# Patient Record
Sex: FEMALE | Race: WHITE | NOT HISPANIC OR LATINO | Employment: FULL TIME | URBAN - METROPOLITAN AREA
[De-identification: names, ages, dates, MRNs, and addresses within clinical notes are randomized per-mention and may not be internally consistent; named-entity substitution may affect disease eponyms.]

---

## 2018-03-29 ENCOUNTER — APPOINTMENT (OUTPATIENT)
Dept: RADIOLOGY | Facility: MEDICAL CENTER | Age: 28
End: 2018-03-29
Attending: EMERGENCY MEDICINE
Payer: COMMERCIAL

## 2018-03-29 ENCOUNTER — HOSPITAL ENCOUNTER (EMERGENCY)
Facility: MEDICAL CENTER | Age: 28
End: 2018-03-29
Attending: EMERGENCY MEDICINE
Payer: COMMERCIAL

## 2018-03-29 VITALS
DIASTOLIC BLOOD PRESSURE: 63 MMHG | TEMPERATURE: 98.3 F | SYSTOLIC BLOOD PRESSURE: 154 MMHG | HEART RATE: 89 BPM | BODY MASS INDEX: 31.43 KG/M2 | RESPIRATION RATE: 16 BRPM | OXYGEN SATURATION: 96 % | WEIGHT: 195.55 LBS | HEIGHT: 66 IN

## 2018-03-29 DIAGNOSIS — O20.0 THREATENED MISCARRIAGE: Primary | ICD-10-CM

## 2018-03-29 LAB
ANION GAP SERPL CALC-SCNC: 8 MMOL/L (ref 0–11.9)
B-HCG SERPL-ACNC: 231.5 MIU/ML (ref 0–5)
BASOPHILS # BLD AUTO: 0.7 % (ref 0–1.8)
BASOPHILS # BLD: 0.05 K/UL (ref 0–0.12)
BUN SERPL-MCNC: 16 MG/DL (ref 8–22)
CALCIUM SERPL-MCNC: 9.3 MG/DL (ref 8.5–10.5)
CHLORIDE SERPL-SCNC: 104 MMOL/L (ref 96–112)
CO2 SERPL-SCNC: 25 MMOL/L (ref 20–33)
CREAT SERPL-MCNC: 0.62 MG/DL (ref 0.5–1.4)
EOSINOPHIL # BLD AUTO: 0.07 K/UL (ref 0–0.51)
EOSINOPHIL NFR BLD: 1 % (ref 0–6.9)
ERYTHROCYTE [DISTWIDTH] IN BLOOD BY AUTOMATED COUNT: 41.6 FL (ref 35.9–50)
GLUCOSE SERPL-MCNC: 113 MG/DL (ref 65–99)
HCG SERPL QL: POSITIVE
HCT VFR BLD AUTO: 41.8 % (ref 37–47)
HGB BLD-MCNC: 14 G/DL (ref 12–16)
IMM GRANULOCYTES # BLD AUTO: 0.02 K/UL (ref 0–0.11)
IMM GRANULOCYTES NFR BLD AUTO: 0.3 % (ref 0–0.9)
LYMPHOCYTES # BLD AUTO: 1.95 K/UL (ref 1–4.8)
LYMPHOCYTES NFR BLD: 27.5 % (ref 22–41)
MCH RBC QN AUTO: 27.4 PG (ref 27–33)
MCHC RBC AUTO-ENTMCNC: 33.5 G/DL (ref 33.6–35)
MCV RBC AUTO: 81.8 FL (ref 81.4–97.8)
MONOCYTES # BLD AUTO: 0.46 K/UL (ref 0–0.85)
MONOCYTES NFR BLD AUTO: 6.5 % (ref 0–13.4)
NEUTROPHILS # BLD AUTO: 4.55 K/UL (ref 2–7.15)
NEUTROPHILS NFR BLD: 64 % (ref 44–72)
NRBC # BLD AUTO: 0 K/UL
NRBC BLD-RTO: 0 /100 WBC
NUMBER OF RH DOSES IND 8505RD: NORMAL
PLATELET # BLD AUTO: 288 K/UL (ref 164–446)
PMV BLD AUTO: 11 FL (ref 9–12.9)
POTASSIUM SERPL-SCNC: 3.3 MMOL/L (ref 3.6–5.5)
RBC # BLD AUTO: 5.11 M/UL (ref 4.2–5.4)
RH BLD: NORMAL
SODIUM SERPL-SCNC: 137 MMOL/L (ref 135–145)
WBC # BLD AUTO: 7.1 K/UL (ref 4.8–10.8)

## 2018-03-29 PROCEDURE — 80048 BASIC METABOLIC PNL TOTAL CA: CPT

## 2018-03-29 PROCEDURE — 84702 CHORIONIC GONADOTROPIN TEST: CPT

## 2018-03-29 PROCEDURE — 86901 BLOOD TYPING SEROLOGIC RH(D): CPT

## 2018-03-29 PROCEDURE — 84703 CHORIONIC GONADOTROPIN ASSAY: CPT

## 2018-03-29 PROCEDURE — 85025 COMPLETE CBC W/AUTO DIFF WBC: CPT

## 2018-03-29 PROCEDURE — 99284 EMERGENCY DEPT VISIT MOD MDM: CPT

## 2018-03-29 PROCEDURE — 76817 TRANSVAGINAL US OBSTETRIC: CPT

## 2018-03-29 NOTE — DISCHARGE INSTRUCTIONS
Threatened Miscarriage  A threatened miscarriage occurs when you have vaginal bleeding during your first 20 weeks of pregnancy but the pregnancy has not ended. If you have vaginal bleeding during this time, your health care provider will do tests to make sure you are still pregnant. If the tests show you are still pregnant and the developing baby (fetus) inside your womb (uterus) is still growing, your condition is considered a threatened miscarriage.  A threatened miscarriage does not mean your pregnancy will end, but it does increase the risk of losing your pregnancy (complete miscarriage).  What are the causes?  The cause of a threatened miscarriage is usually not known. If you go on to have a complete miscarriage, the most common cause is an abnormal number of chromosomes in the developing baby. Chromosomes are the structures inside cells that hold all your genetic material.  Some causes of vaginal bleeding that do not result in miscarriage include:  · Having sex.  · Having an infection.  · Normal hormone changes of pregnancy.  · Bleeding that occurs when an egg implants in your uterus.  What increases the risk?  Risk factors for bleeding in early pregnancy include:  · Obesity.  · Smoking.  · Drinking excessive amounts of alcohol or caffeine.  · Recreational drug use.  What are the signs or symptoms?  · Light vaginal bleeding.  · Mild abdominal pain or cramps.  How is this diagnosed?  If you have bleeding with or without abdominal pain before 20 weeks of pregnancy, your health care provider will do tests to check whether you are still pregnant. One important test involves using sound waves and a computer (ultrasound) to create images of the inside of your uterus. Other tests include an internal exam of your vagina and uterus (pelvic exam) and measurement of your baby’s heart rate.  You may be diagnosed with a threatened miscarriage if:  · Ultrasound testing shows you are still pregnant.  · Your baby’s heart  rate is strong.  · A pelvic exam shows that the opening between your uterus and your vagina (cervix) is closed.  · Your heart rate and blood pressure are stable.  · Blood tests confirm you are still pregnant.  How is this treated?  No treatments have been shown to prevent a threatened miscarriage from going on to a complete miscarriage. However, the right home care is important.  Follow these instructions at home:  · Make sure you keep all your appointments for prenatal care. This is very important.  · Get plenty of rest.  · Do not have sex or use tampons if you have vaginal bleeding.  · Do not douche.  · Do not smoke or use recreational drugs.  · Do not drink alcohol.  · Avoid caffeine.  Contact a health care provider if:  · You have light vaginal bleeding or spotting while pregnant.  · You have abdominal pain or cramping.  · You have a fever.  Get help right away if:  · You have heavy vaginal bleeding.  · You have blood clots coming from your vagina.  · You have severe low back pain or abdominal cramps.  · You have fever, chills, and severe abdominal pain.  This information is not intended to replace advice given to you by your health care provider. Make sure you discuss any questions you have with your health care provider.  Document Released: 12/18/2006 Document Revised: 05/25/2017 Document Reviewed: 10/14/2014  [employer] Interactive Patient Education © 2017 [employer]    Ectopic Pregnancy  An ectopic pregnancy happens when a fertilized egg grows outside the uterus. A pregnancy cannot live outside of the uterus. This problem often happens in the fallopian tube. It is often caused by damage to the fallopian tube.  If this problem is found early, you may be treated with medicine. If your tube tears or bursts open (ruptures), you will bleed inside. This is an emergency. You will need surgery. Get help right away.  What are the signs or symptoms?  You may have normal pregnancy symptoms at first. These  include:  · Missing your period.  · Feeling sick to your stomach (nauseous).  · Being tired.  · Having tender breasts.  Then, you may start to have symptoms that are not normal. These include:  · Pain with sex (intercourse).  · Bleeding from the vagina. This includes light bleeding (spotting).  · Belly (abdomen) or lower belly cramping or pain. This may be felt on one side.  · A fast heartbeat (pulse).  · Passing out (fainting) after going poop (bowel movement).  If your tube tears, you may have symptoms such as:  · Really bad pain in the belly or lower belly. This happens suddenly.  · Dizziness.  · Passing out.  · Shoulder pain.  Get help right away if:  You have any of these symptoms. This is an emergency.  This information is not intended to replace advice given to you by your health care provider. Make sure you discuss any questions you have with your health care provider.  Document Released: 03/16/2010 Document Revised: 05/25/2017 Document Reviewed: 07/30/2014  [employer] Interactive Patient Education © 2017 [employer]  Threatened Miscarriage  A threatened miscarriage occurs when you have vaginal bleeding during your first 20 weeks of pregnancy but the pregnancy has not ended. If you have vaginal bleeding during this time, your health care provider will do tests to make sure you are still pregnant. If the tests show you are still pregnant and the developing baby (fetus) inside your womb (uterus) is still growing, your condition is considered a threatened miscarriage.  A threatened miscarriage does not mean your pregnancy will end, but it does increase the risk of losing your pregnancy (complete miscarriage).  What are the causes?  The cause of a threatened miscarriage is usually not known. If you go on to have a complete miscarriage, the most common cause is an abnormal number of chromosomes in the developing baby. Chromosomes are the structures inside cells that hold all your genetic material.  Some causes of  vaginal bleeding that do not result in miscarriage include:  · Having sex.  · Having an infection.  · Normal hormone changes of pregnancy.  · Bleeding that occurs when an egg implants in your uterus.  What increases the risk?  Risk factors for bleeding in early pregnancy include:  · Obesity.  · Smoking.  · Drinking excessive amounts of alcohol or caffeine.  · Recreational drug use.  What are the signs or symptoms?  · Light vaginal bleeding.  · Mild abdominal pain or cramps.  How is this diagnosed?  If you have bleeding with or without abdominal pain before 20 weeks of pregnancy, your health care provider will do tests to check whether you are still pregnant. One important test involves using sound waves and a computer (ultrasound) to create images of the inside of your uterus. Other tests include an internal exam of your vagina and uterus (pelvic exam) and measurement of your baby’s heart rate.  You may be diagnosed with a threatened miscarriage if:  · Ultrasound testing shows you are still pregnant.  · Your baby’s heart rate is strong.  · A pelvic exam shows that the opening between your uterus and your vagina (cervix) is closed.  · Your heart rate and blood pressure are stable.  · Blood tests confirm you are still pregnant.  How is this treated?  No treatments have been shown to prevent a threatened miscarriage from going on to a complete miscarriage. However, the right home care is important.  Follow these instructions at home:  · Make sure you keep all your appointments for prenatal care. This is very important.  · Get plenty of rest.  · Do not have sex or use tampons if you have vaginal bleeding.  · Do not douche.  · Do not smoke or use recreational drugs.  · Do not drink alcohol.  · Avoid caffeine.  Contact a health care provider if:  · You have light vaginal bleeding or spotting while pregnant.  · You have abdominal pain or cramping.  · You have a fever.  Get help right away if:  · You have heavy vaginal  bleeding.  · You have blood clots coming from your vagina.  · You have severe low back pain or abdominal cramps.  · You have fever, chills, and severe abdominal pain.  This information is not intended to replace advice given to you by your health care provider. Make sure you discuss any questions you have with your health care provider.  Document Released: 12/18/2006 Document Revised: 05/25/2017 Document Reviewed: 10/14/2014  [employer] Interactive Patient Education © 2017 [employer]

## 2018-03-29 NOTE — ED NOTES
Patient dc'd to home w/ self. Patient alert and oriented x 4. Patient ambulatory w/ steady gait. Patient verbalizes understanding of discharge instructions, follow up lab work ([facility] lab slip provided by [facility]), follow up w/ OB/GYN. Patient denies questions upon discharge.

## 2018-03-29 NOTE — ED TRIAGE NOTES
Pt states is 6 weeks pregnant, reports spotting x 4-5 days, more red today. Denies abd cramping, states had back pain earlier that subsided.

## 2018-03-29 NOTE — ED PROVIDER NOTES
"ED Provider Note    Scribed for Sarkis Delaney M.D. by Deepti Vanegas. 3/29/2018  12:47 PM    Primary Care Provider: None  Means of arrival: Walk in  History limited by: None    CHIEF COMPLAINT  Chief Complaint   Patient presents with   • Vaginal Bleeding       HPI  Amy Rey is a 27 y.o. female who presents to the ED complaining of vaginal bleeding. She states her last period was 6 weeks ago she's had a positive pregnancy test but having spotting vaginally for 3-4 days. No pain. Nothing makes it better or worse.    One spontaneous       REVIEW OF SYSTEMS    CONSTITUTIONAL:  Denies fever, chills, weight gain/loss, or weakness.  CARDIOVASCULAR:  Denies chest pain, palpitations, or swelling.  RESPIRATORY:  Denies cough, shortness of breath,  GI:  Denies abdominal pain,  vomiting, or diarrhea.  : Positive vaginal bleeding spotting..   MUSCULOSKELETAL:  Denies weakness, joint swelling, or back pain.  SKIN:  No rash or bruising.  NEUROLOGIC:  Denies headache, focal weakness, or numbness.  PSYCHIATRIC:  Denies depression.    See HPI for further details. C.      PAST MEDICAL HISTORY  Patient is six weeks gravid.      FAMILY HISTORY  History reviewed. No pertinent family history.       SOCIAL HISTORY  Patient denies a pertinent social history.      SURGICAL HISTORY  Patient denies a pertinent surgical history.      CURRENT MEDICATIONS  None    ALLERGIES  None      PHYSICAL EXAM  VITAL SIGNS: /63   Pulse (!) 112   Temp 36.8 °C (98.3 °F) (Temporal)   Resp 16   Ht 1.676 m (5' 6\")   Wt 88.7 kg (195 lb 8.8 oz)   SpO2 98%   BMI 31.56 kg/m²        Constitutional: Patient is awake and alert. No acute respiratory distress. Well developed, Well nourished, Non-toxic appearance.  HENT: Normocephalic, Atraumatic  Eyes:  Sclera and conjunctiva clear,   Neck:  Supple no nuchal rigidity, . Non-tender  Cardiovascular: Heart is regular rate and rhythm no murmur, rub or thrill.   Thorax & Lungs: Chest is " symmetrical, with good breath sounds. No wheezing or crackles.  Abdomen: Soft, No tenderness no hepatosplenomegaly there is no guarding or rebound, No masses, No pulsatile masses.   Pelvic: Female nurse present. External genitalia normal. Vault with scant blood. Cervical os closed.  Skin: Warm, Dry, no petechia, purpura, or rash.   Back: Non tender with palpation, No CVA tenderness.   Extremities: No edema. Non tender.   Musculoskeletal: Good range of motion to upper or lower extremities   Neurologic: Alert & oriented  Strength is symmetrical in upper and lower extremities. DTRs are 2+ in the patellas    Psychiatric: Normal affect.          LABS  Results for orders placed or performed during the hospital encounter of 03/29/18   CBC WITH DIFFERENTIAL   Result Value Ref Range    WBC 7.1 4.8 - 10.8 K/uL    RBC 5.11 4.20 - 5.40 M/uL    Hemoglobin 14.0 12.0 - 16.0 g/dL    Hematocrit 41.8 37.0 - 47.0 %    MCV 81.8 81.4 - 97.8 fL    MCH 27.4 27.0 - 33.0 pg    MCHC 33.5 (L) 33.6 - 35.0 g/dL    RDW 41.6 35.9 - 50.0 fL    Platelet Count 288 164 - 446 K/uL    MPV 11.0 9.0 - 12.9 fL    Neutrophils-Polys 64.00 44.00 - 72.00 %    Lymphocytes 27.50 22.00 - 41.00 %    Monocytes 6.50 0.00 - 13.40 %    Eosinophils 1.00 0.00 - 6.90 %    Basophils 0.70 0.00 - 1.80 %    Immature Granulocytes 0.30 0.00 - 0.90 %    Nucleated RBC 0.00 /100 WBC    Neutrophils (Absolute) 4.55 2.00 - 7.15 K/uL    Lymphs (Absolute) 1.95 1.00 - 4.80 K/uL    Monos (Absolute) 0.46 0.00 - 0.85 K/uL    Eos (Absolute) 0.07 0.00 - 0.51 K/uL    Baso (Absolute) 0.05 0.00 - 0.12 K/uL    Immature Granulocytes (abs) 0.02 0.00 - 0.11 K/uL    NRBC (Absolute) 0.00 K/uL   BASIC METABOLIC PANEL   Result Value Ref Range    Sodium 137 135 - 145 mmol/L    Potassium 3.3 (L) 3.6 - 5.5 mmol/L    Chloride 104 96 - 112 mmol/L    Co2 25 20 - 33 mmol/L    Glucose 113 (H) 65 - 99 mg/dL    Bun 16 8 - 22 mg/dL    Creatinine 0.62 0.50 - 1.40 mg/dL    Calcium 9.3 8.5 - 10.5 mg/dL    Anion  Gap 8.0 0.0 - 11.9   HCG QUAL SERUM   Result Value Ref Range    Beta-Hcg Qualitative Serum Positive (A) Negative   RH TYPE FOR RHOGAM FROM E.D.   Result Value Ref Range    Emergency Department Rh Typing POS     Number Of Rh Doses Indicated ZERO    ESTIMATED GFR   Result Value Ref Range    GFR If African American >60 >60 mL/min/1.73 m 2    GFR If Non African American >60 >60 mL/min/1.73 m 2   HCG QUANTITATIVE SERUM   Result Value Ref Range    Bhcg 231.5 (H) 0.0 - 5.0 mIU/mL      All labs reviewed by me.      RADIOLOGY/PROCEDURES  US-OB PELVIS TRANSVAGINAL    (Results Pending)     US-OB PELVIS TRANSVAGINAL   Final Result      No intrauterine pregnancy is identified. Findings can be seen in the setting of very early pregnancy, missed spontaneous , with ectopic pregnancy not excluded. No adnexal mass is seen. Correlation with beta-hCG level and follow-up beta-hCG level    is recommended.      Normal appearance of the ovaries.      Thickened endometrial stripe.             The radiologist's interpretations of all radiological studies have been reviewed by me.       COURSE & MEDICAL DECISION MAKING  Pertinent Labs & Imaging studies reviewed. (See chart for details)    Differential Diagnosis include but are not limited to:  IUP, ectopic pregnancy, threatened miscarriage or UTI.    12:35 PM Obtained and reviewed patient's electronic medical record which indicates no prior ED visits.    12:38 PM Patient seen and examined at bedside. Patient presents for vaginal bleeding.      Initial orders in the Emergency Department included laboratory testing: CBC with differential, BMP, estimated GFR, HCG qualitative serum and Rh type.     1:23 PM Pelvic exam was completed with the assistance of a female nurse as noted above.         The patient will return for new or persisting symptoms including pain bleeding vomiting fevers..  The patient verbalizes understanding and will comply.  Patient is stable at the time of discharge.       Patient who is pregnant and having spotting. Her ultrasound does not show any intrauterine pregnancy or obvious ectopic pregnancy. We are not sure the pregnancy is. Her quantitative beta hCG is positive but low she is Rh+. This period of time we'll billow discharge her home for close follow-up as an outpatient. Again she has no discomfort or pain whatsoever. I did discuss the case with her that she could have an ectopic pregnancy or early intrauterine pregnancy or she could've had fetal demise. She will follow up with the ObGyn doctor on call Dr. Don wife talked to on the phone and she will see her in follow-up.    DISPOSITION  Patient will be discharged home in stable condition.      FOLLOW UP  Dr. Don within 1 week  Repeat quantitative beta hCG on Saturday    The patient is referred to a primary physician for blood pressure management, diabetic screening, and for all other preventative health concerns.      OUTPATIENT MEDICATIONS  New Prescriptions    No medications on file         DIAGNOSIS  1. Vaginal bleeding  2. Early intrauterine pregnancy versus ectopic pregnancy versus blighted ovum       PLAN    1. Repeat quantitative beta hCG in 2 days  2. Force juices and liquids  3. Ectopic pregnancy information sheet/threatened miscarriage information sheet/pelvic rest.  4. Return to the emergency department for increased pains, fevers, vomiting or change in condition.  5. Follow-up with Dr. Pollard call tomorrow for an appointment within one week      The note accurately reflects work and decisions made by me.  Sarkis Delaney  3/29/2018  5:12 PM     Deepti SUAZO (Scribe), am scribing for, and in the presence of, Sarkis Delaney M.D.    Electronically signed by: Deepti Thompson), 3/29/2018    Sarkis SUAZO M.D. personally performed the services described in this documentation, as scribed by Deepti Vanegas in my presence, and it is both accurate and complete.

## 2018-05-25 ENCOUNTER — OFFICE VISIT (OUTPATIENT)
Dept: MEDICAL GROUP | Facility: MEDICAL CENTER | Age: 28
End: 2018-05-25
Payer: COMMERCIAL

## 2018-05-25 VITALS
OXYGEN SATURATION: 97 % | BODY MASS INDEX: 31.02 KG/M2 | HEART RATE: 87 BPM | DIASTOLIC BLOOD PRESSURE: 76 MMHG | WEIGHT: 193 LBS | SYSTOLIC BLOOD PRESSURE: 118 MMHG | RESPIRATION RATE: 16 BRPM | HEIGHT: 66 IN | TEMPERATURE: 98.7 F

## 2018-05-25 DIAGNOSIS — Z83.3 FAMILY HISTORY OF DIABETES MELLITUS IN FATHER: ICD-10-CM

## 2018-05-25 DIAGNOSIS — F33.1 MODERATE EPISODE OF RECURRENT MAJOR DEPRESSIVE DISORDER (HCC): ICD-10-CM

## 2018-05-25 DIAGNOSIS — F41.9 ANXIETY: ICD-10-CM

## 2018-05-25 DIAGNOSIS — E66.9 OBESITY (BMI 30-39.9): ICD-10-CM

## 2018-05-25 PROCEDURE — 99204 OFFICE O/P NEW MOD 45 MIN: CPT | Performed by: NURSE PRACTITIONER

## 2018-05-25 RX ORDER — ALPRAZOLAM 0.25 MG/1
0.25 TABLET ORAL NIGHTLY PRN
COMMUNITY
End: 2018-05-25

## 2018-05-25 RX ORDER — ESCITALOPRAM OXALATE 10 MG/1
10 TABLET ORAL DAILY
Qty: 30 TAB | Refills: 11 | Status: SHIPPED | OUTPATIENT
Start: 2018-05-25 | End: 2018-12-12

## 2018-05-25 ASSESSMENT — PATIENT HEALTH QUESTIONNAIRE - PHQ9
CLINICAL INTERPRETATION OF PHQ2 SCORE: 2
5. POOR APPETITE OR OVEREATING: 2 - MORE THAN HALF THE DAYS
SUM OF ALL RESPONSES TO PHQ QUESTIONS 1-9: 12

## 2018-05-25 NOTE — ASSESSMENT & PLAN NOTE
Patient has struggled with anxiety for several years.  She was previously on multiple antidepressants and antianxiety medications.  Most recently she was taking Xanax for panic attacks.  She has not been on this for quite some time.  She has had significant worsening in her anxiety since stopping all of her medications when she moved from [place] to [place].  She has been attempting to cope with it with lifestyle, her anxiety has recently worsened after experiencing 2 miscarriages.  She is not currently having panic attacks, states that she has significant worry and tends to sit up at night and worry about things that she should not be worrying about.

## 2018-05-25 NOTE — ASSESSMENT & PLAN NOTE
Ongoing for several years. Was previously seeing a doctor in [place] and was on multiple different antidepressants and antianxiety medication, but with move and insurance change she no longer was able to obtain prescriptions. Most recent medications were Wellbutrin and xanax. Mostly feels that she is anxious which is contributing to the depression. Denies suicidal thoughts.     Depression Screening    Little interest or pleasure in doing things?  1 - several days  Feeling down, depressed , or hopeless? 1 - several days  Trouble falling or staying asleep, or sleeping too much?  2 - more than half the days  Feeling tired or having little energy?  2 - more than half the days  Poor appetite or overeating?  2 - more than half the days  Feeling bad about yourself - or that you are a failure or have let yourself or your family down? 2 - more than half the days  Trouble concentrating on things, such as reading the newspaper or watching television? 2 - more than half the days  Moving or speaking so slowly that other people could have noticed.  Or the opposite - being so fidgety or restless that you have been moving around a lot more than usual?  0 - not at all  Thoughts that you would be better off dead, or of hurting yourself?  0 - not at all  Patient Health Questionnaire Score: 12      If depressive symptoms identified deferred to follow up visit unless specifically addressed in assesment and plan.    Interpretation of PHQ-9 Total Score   Score Severity   10-14 Moderate Depression

## 2018-05-25 NOTE — PROGRESS NOTES
Amy Rey is a 27 y.o. female here to establish care and discuss her depression and anxiety:  HPI:      Moderate episode of recurrent major depressive disorder (HCC)  Ongoing for several years. Was previously seeing a doctor in [place] and was on multiple different antidepressants and antianxiety medication, but with move and insurance change she no longer was able to obtain prescriptions. Most recent medications were Wellbutrin and xanax. Mostly feels that she is anxious which is contributing to the depression. Denies suicidal thoughts.     Depression Screening    Little interest or pleasure in doing things?  1 - several days  Feeling down, depressed , or hopeless? 1 - several days  Trouble falling or staying asleep, or sleeping too much?  2 - more than half the days  Feeling tired or having little energy?  2 - more than half the days  Poor appetite or overeating?  2 - more than half the days  Feeling bad about yourself - or that you are a failure or have let yourself or your family down? 2 - more than half the days  Trouble concentrating on things, such as reading the newspaper or watching television? 2 - more than half the days  Moving or speaking so slowly that other people could have noticed.  Or the opposite - being so fidgety or restless that you have been moving around a lot more than usual?  0 - not at all  Thoughts that you would be better off dead, or of hurting yourself?  0 - not at all  Patient Health Questionnaire Score: 12      If depressive symptoms identified deferred to follow up visit unless specifically addressed in assesment and plan.    Interpretation of PHQ-9 Total Score   Score Severity   10-14 Moderate Depression         Anxiety  Patient has struggled with anxiety for several years.  She was previously on multiple antidepressants and antianxiety medications.  Most recently she was taking Xanax for panic attacks.  She has not been on this for quite some time.  She has had significant  "worsening in her anxiety since stopping all of her medications when she moved from [place] to [place].  She has been attempting to cope with it with lifestyle, her anxiety has recently worsened after experiencing 2 miscarriages.  She is not currently having panic attacks, states that she has significant worry and tends to sit up at night and worry about things that she should not be worrying about.    Current medicines (including changes today)  Current Outpatient Prescriptions   Medication Sig Dispense Refill   • escitalopram (LEXAPRO) 10 MG Tab Take 1 Tab by mouth every day. 30 Tab 11     No current facility-administered medications for this visit.      She  has a past medical history of Anxiety and Depression.  She  has no past surgical history on file.  Social History   Substance Use Topics   • Smoking status: Current Every Day Smoker     Packs/day: 0.50     Years: 10.00   • Smokeless tobacco: Never Used   • Alcohol use No     Social History     Social History Narrative   • No narrative on file     Family History   Problem Relation Age of Onset   • Hypertension Mother    • Diabetes Father      Family Status   Relation Status   • Mother Alive   • Father Alive         ROS  No chest pain, no abdominal pain, no rash.  Positive ROS as per HPI.  All other systems reviewed and are negative      Objective:     Blood pressure 118/76, pulse 87, temperature 37.1 °C (98.7 °F), resp. rate 16, height 1.676 m (5' 5.98\"), weight 87.5 kg (193 lb), last menstrual period 03/30/2018, SpO2 97 %, not currently breastfeeding. Body mass index is 31.17 kg/m².     Physical Exam:    Constitutional: Alert, no distress.  Skin: Warm, dry, good turgor, no rashes in visible areas.  Eye: Equal, round and reactive, conjunctiva clear, lids normal.  ENMT: Lips without lesions, good dentition, oropharynx clear.  Neck: Trachea midline, no masses, no thyromegaly. No cervical or supraclavicular lymphadenopathy.  Respiratory: Unlabored respiratory " effort, lungs clear to auscultation, no wheezes, no ronchi.  Cardiovascular: Normal S1, S2, no murmur, no edema.  Abdomen: Soft, non-tender, no masses, no hepatosplenomegaly.  Psych: Alert and oriented x3, normal affect and mood.        Assessment and Plan:   The following treatment plan was discussed    1. Moderate episode of recurrent major depressive disorder (HCC)  Unstable.  Begin Lexapro 10 mg daily.  Check TSH and vitamin D.  Patient declines referral to behavioral health at this time as she does not feel she has time to see another specialist.  She has done counseling in the past.  - escitalopram (LEXAPRO) 10 MG Tab; Take 1 Tab by mouth every day.  Dispense: 30 Tab; Refill: 11  - TSH WITH REFLEX TO FT4; Future  - VITAMIN D,25 HYDROXY; Future  - Patient has been identified as being depressed and appropriate orders and counseling have been given    2. Family history of diabetes mellitus in father  Check A1c.  - HEMOGLOBIN A1C; Future    3. Obesity (BMI 30-39.9)  Check A1c.  - Patient identified as having weight management issue.  Appropriate orders and counseling given.  - HEMOGLOBIN A1C; Future      Followup: Return in about 4 weeks (around 6/22/2018) for Depression/Anxiety.    I have placed the below orders and discussed them with an approved delegating provider. The MA is performing the below orders under the direction of Dr. Kraft

## 2018-08-26 ENCOUNTER — OFFICE VISIT (OUTPATIENT)
Dept: URGENT CARE | Facility: PHYSICIAN GROUP | Age: 28
End: 2018-08-26
Payer: COMMERCIAL

## 2018-08-26 VITALS
RESPIRATION RATE: 12 BRPM | DIASTOLIC BLOOD PRESSURE: 76 MMHG | TEMPERATURE: 97.7 F | BODY MASS INDEX: 30.86 KG/M2 | SYSTOLIC BLOOD PRESSURE: 116 MMHG | WEIGHT: 192 LBS | OXYGEN SATURATION: 96 % | HEART RATE: 74 BPM | HEIGHT: 66 IN

## 2018-08-26 DIAGNOSIS — G44.209 ACUTE NON INTRACTABLE TENSION-TYPE HEADACHE: ICD-10-CM

## 2018-08-26 PROCEDURE — 99204 OFFICE O/P NEW MOD 45 MIN: CPT | Performed by: FAMILY MEDICINE

## 2018-08-26 RX ORDER — CYCLOBENZAPRINE HCL 10 MG
10 TABLET ORAL
Qty: 15 TAB | Refills: 0 | Status: SHIPPED | OUTPATIENT
Start: 2018-08-26 | End: 2018-10-04

## 2018-08-26 RX ORDER — MELOXICAM 15 MG/1
15 TABLET ORAL DAILY
Qty: 30 TAB | Refills: 0 | Status: SHIPPED | OUTPATIENT
Start: 2018-08-26 | End: 2018-10-04

## 2018-08-26 RX ORDER — PROMETHAZINE HYDROCHLORIDE 25 MG/1
25 TABLET ORAL EVERY 6 HOURS PRN
Qty: 30 TAB | Refills: 0 | Status: SHIPPED | OUTPATIENT
Start: 2018-08-26 | End: 2018-10-04

## 2018-08-26 ASSESSMENT — ENCOUNTER SYMPTOMS
SHORTNESS OF BREATH: 0
FEVER: 0
WEAKNESS: 0
TINGLING: 0
DIZZINESS: 0
MYALGIAS: 0
EYE PAIN: 0
SEIZURES: 0
VOMITING: 0
HEADACHES: 1
SORE THROAT: 0
NAUSEA: 0
CHILLS: 0
PHOTOPHOBIA: 1

## 2018-08-26 ASSESSMENT — PAIN SCALES - GENERAL: PAINLEVEL: 6=MODERATE PAIN

## 2018-08-26 NOTE — PROGRESS NOTES
Subjective:     Amy Rey is a 27 y.o. female who presents for Headache (Headache in the back and front of head, tense shoulders/neck, light sensitivity, pressure behind eyes  x4days )       Headache    This is a new problem. The current episode started in the past 7 days. The problem occurs constantly. The problem has been waxing and waning. The pain is located in the occipital and frontal region. The pain does not radiate. The quality of the pain is described as aching, dull and throbbing. The pain is severe. Associated symptoms include phonophobia and photophobia. Pertinent negatives include no dizziness, eye pain, fever, nausea, seizures, sore throat, tingling, vomiting or weakness.     Past Medical History:   Diagnosis Date   • Anxiety    • Depression    History reviewed. No pertinent surgical history.  Social History     Social History   • Marital status:      Spouse name: N/A   • Number of children: N/A   • Years of education: N/A     Occupational History   • Not on file.     Social History Main Topics   • Smoking status: Current Every Day Smoker     Packs/day: 0.50     Years: 10.00   • Smokeless tobacco: Never Used   • Alcohol use No   • Drug use: No   • Sexual activity: Yes     Partners: Male     Other Topics Concern   • Not on file     Social History Narrative   • No narrative on file      Family History   Problem Relation Age of Onset   • Hypertension Mother    • Diabetes Father    • Stroke Neg Hx    • Heart Disease Neg Hx     Review of Systems   Constitutional: Negative for chills and fever.   HENT: Negative for sore throat.    Eyes: Positive for photophobia. Negative for pain.   Respiratory: Negative for shortness of breath.    Cardiovascular: Negative for chest pain.   Gastrointestinal: Negative for nausea and vomiting.   Genitourinary: Negative for hematuria.   Musculoskeletal: Negative for myalgias.   Skin: Negative for rash.   Neurological: Positive for headaches. Negative for  "dizziness, tingling, seizures and weakness.   No Known Allergies   Objective:   /76   Pulse 74   Temp 36.5 °C (97.7 °F)   Resp 12   Ht 1.676 m (5' 5.98\")   Wt 87.1 kg (192 lb)   LMP 07/31/2018   SpO2 96%   Breastfeeding? No   BMI 31.01 kg/m²   Physical Exam   Constitutional: She is oriented to person, place, and time. She appears well-developed and well-nourished. No distress.   HENT:   Head: Normocephalic and atraumatic.   Eyes: Pupils are equal, round, and reactive to light. Conjunctivae and EOM are normal.   Cardiovascular: Normal rate and regular rhythm.    No murmur heard.  Pulmonary/Chest: Effort normal and breath sounds normal. No respiratory distress.   Abdominal: Soft. She exhibits no distension. There is no tenderness.   Musculoskeletal:        Cervical back: She exhibits decreased range of motion, tenderness and spasm. She exhibits no bony tenderness and no swelling.   Neurological: She is alert and oriented to person, place, and time. She has normal reflexes. No sensory deficit.   Skin: Skin is warm and dry.   Psychiatric: She has a normal mood and affect.         Assessment/Plan:   Assessment    1. Acute non intractable tension-type headache  - meloxicam (MOBIC) 15 MG tablet; Take 1 Tab by mouth every day.  Dispense: 30 Tab; Refill: 0  - cyclobenzaprine (FLEXERIL) 10 MG Tab; Take 1 Tab by mouth at bedtime as needed.  Dispense: 15 Tab; Refill: 0  - promethazine (PHENERGAN) 25 MG Tab; Take 1 Tab by mouth every 6 hours as needed for Nausea/Vomiting.  Dispense: 30 Tab; Refill: 0    Differential diagnosis, natural history, supportive care, and indications for immediate follow-up discussed.    "

## 2018-10-04 ENCOUNTER — OFFICE VISIT (OUTPATIENT)
Dept: MEDICAL GROUP | Facility: PHYSICIAN GROUP | Age: 28
End: 2018-10-04
Payer: COMMERCIAL

## 2018-10-04 VITALS
TEMPERATURE: 98.1 F | WEIGHT: 201 LBS | SYSTOLIC BLOOD PRESSURE: 126 MMHG | HEART RATE: 90 BPM | RESPIRATION RATE: 14 BRPM | BODY MASS INDEX: 32.3 KG/M2 | DIASTOLIC BLOOD PRESSURE: 78 MMHG | HEIGHT: 66 IN | OXYGEN SATURATION: 95 %

## 2018-10-04 DIAGNOSIS — Z23 NEED FOR VACCINATION: ICD-10-CM

## 2018-10-04 DIAGNOSIS — E66.9 OBESITY (BMI 30-39.9): ICD-10-CM

## 2018-10-04 DIAGNOSIS — F33.1 MODERATE EPISODE OF RECURRENT MAJOR DEPRESSIVE DISORDER (HCC): ICD-10-CM

## 2018-10-04 DIAGNOSIS — G44.209 TENSION HEADACHE: ICD-10-CM

## 2018-10-04 DIAGNOSIS — R09.81 SINUS CONGESTION: ICD-10-CM

## 2018-10-04 PROBLEM — F41.9 ANXIETY: Chronic | Status: ACTIVE | Noted: 2018-05-25

## 2018-10-04 PROBLEM — Z83.3 FAMILY HISTORY OF DIABETES MELLITUS IN FATHER: Status: RESOLVED | Noted: 2018-05-25 | Resolved: 2018-10-04

## 2018-10-04 PROCEDURE — 90732 PPSV23 VACC 2 YRS+ SUBQ/IM: CPT | Performed by: NURSE PRACTITIONER

## 2018-10-04 PROCEDURE — 99213 OFFICE O/P EST LOW 20 MIN: CPT | Mod: 25 | Performed by: NURSE PRACTITIONER

## 2018-10-04 PROCEDURE — 90471 IMMUNIZATION ADMIN: CPT | Performed by: NURSE PRACTITIONER

## 2018-10-04 RX ORDER — BUPROPION HYDROCHLORIDE 150 MG/1
150 TABLET ORAL
Qty: 90 TAB | Refills: 1 | Status: SHIPPED | OUTPATIENT
Start: 2018-10-04

## 2018-10-04 RX ORDER — FLUTICASONE PROPIONATE 50 MCG
1 SPRAY, SUSPENSION (ML) NASAL DAILY
Qty: 1 BOTTLE | Refills: 1 | Status: SHIPPED | OUTPATIENT
Start: 2018-10-04 | End: 2019-04-30

## 2018-10-04 ASSESSMENT — PATIENT HEALTH QUESTIONNAIRE - PHQ9
CLINICAL INTERPRETATION OF PHQ2 SCORE: 2
5. POOR APPETITE OR OVEREATING: 2 - MORE THAN HALF THE DAYS
SUM OF ALL RESPONSES TO PHQ QUESTIONS 1-9: 11

## 2018-10-04 NOTE — LETTER
[facility]  NATALIA Parekh  [address]  [place] [address]  Fax: [phone]   Authorization for Release/Disclosure of   Protected Health Information   Name: AMY REY : 1990 SSN: [id number]   Address: [address] [place] [address]  [address]  [place] [address] Phone:    [phone] (home)    I authorize the entity listed below to release/disclose the PHI below to:   [facility]/NATALIA Parekh and NATALIA Parekh   Provider or Entity Name:  [facility]   Address   [facility]   Phone:      Fax:     Reason for request: continuity of care   Information to be released:    [  ] LAST COLONOSCOPY,  including any PATH REPORT and follow-up  [  ] LAST FIT/COLOGUARD RESULT [  ] LAST DEXA  [  ] LAST MAMMOGRAM  [X ] LAST PAP  [  ] LAST LABS [  ] RETINA EXAM REPORT  [  ] IMMUNIZATION RECORDS  [  ] Release all info      [  ] Check here and initial the line next to each item to release ALL health information INCLUDING  _____ Care and treatment for drug and / or alcohol abuse  _____ HIV testing, infection status, or AIDS  _____ Genetic Testing    DATES OF SERVICE OR TIME PERIOD TO BE DISCLOSED: _____________  I understand and acknowledge that:  * This Authorization may be revoked at any time by you in writing, except if your health information has already been used or disclosed.  * Your health information that will be used or disclosed as a result of you signing this authorization could be re-disclosed by the recipient. If this occurs, your re-disclosed health information may no longer be protected by State or Federal laws.  * You may refuse to sign this Authorization. Your refusal will not affect your ability to obtain treatment.  * This Authorization becomes effective upon signing and will  on (date) __________.      If no date is indicated, this Authorization will  one (1) year from the signature date.    Name: Amy Rey    Signature:   Date:       10/4/2018       PLEASE FAX REQUESTED RECORDS BACK TO: [phone]

## 2018-10-04 NOTE — ASSESSMENT & PLAN NOTE
This is a new problem to me but a chronic health problem for this patient that is uncontrolled with current medications and lifestyle measures.  Patient's PHQ-9 score is 11 today.  The patient in the past has been on several different antidepressant medications, but could not handle the various side effects.  Most recently she was started on Lexapro 10 mg daily, which she says is helping significantly reduce her depression and anxiety symptoms.  She is concerned today because since starting the Lexapro she has gained some weight and feels very discouraged that she is now over 200 pounds.  In the past she would go jogging or running with her  in the evenings and she got a gym membership, but she is having trouble finding motivation to doing any that is physically demanding.  She is also finding herself craving bad foods and obsessing over eating the bad foods until she can eat the food which she then overeats.       Depression Screen (PHQ-2/PHQ-9) 5/25/2018 10/4/2018   PHQ-2 Total Score 2 2   PHQ-9 Total Score 11 11       Interpretation of PHQ-9 Total Score   Score Severity   1-4 Minimal Depression   5-9 Mild Depression   10-14 Moderate Depression   15-19 Moderately Severe Depression   20-27 Severe Depression

## 2018-10-04 NOTE — ASSESSMENT & PLAN NOTE
This is a new problem to me but a chronic health problem for this patient.  The patient reports that she is having tension headache episodes about 2-3 times per year and this has been going on for the past few years.  She directly relates the episodes to stress.  In the past when she experiences a tension headache she will apply heat to her neck, rest, sleep it off, use massage, and Advil.  Generally this has been effective in treating her headaches until her last tension headache at the end of August 2018.  Her tension headache was lasting a week and nothing she was trying would relieve symptoms.  She was eventually seen in urgent care for this issue.  She was prescribed Flexeril, Mobic, and Phenergan which she states was extremely effective in getting rid of her headache.

## 2018-10-04 NOTE — PROGRESS NOTES
CC:  Diagnoses of Moderate episode of recurrent major depressive disorder (HCC), Tension headache, Sinus congestion, Obesity (BMI 30-39.9), and Need for vaccination were pertinent to this visit.    HISTORY OF THE PRESENT ILLNESS: Patient is a 27 y.o. female. This pleasant patient is here today to establish care and discuss multiple chronic issues as detailed below.    Health Maintenance: Completed      Tension headache  This is a new problem to me but a chronic health problem for this patient.  The patient reports that she is having tension headache episodes about 2-3 times per year and this has been going on for the past few years.  She directly relates the episodes to stress.  In the past when she experiences a tension headache she will apply heat to her neck, rest, sleep it off, use massage, and Advil.  Generally this has been effective in treating her headaches until her last tension headache at the end of August 2018.  Her tension headache was lasting a week and nothing she was trying would relieve symptoms.  She was eventually seen in urgent care for this issue.  She was prescribed Flexeril, Mobic, and Phenergan which she states was extremely effective in getting rid of her headache.    Sinus congestion  This is an acute issue that is improving.  Symptoms started 2 days ago with a scratchy sore throat that is now resolved.  One day ago she was having ear pain and a sensation that her ears were plugged, as well as frontal sinus pressure.  Today she no longer has a sore throat or ear pain, but she does have frontal sinus pressure, sinus congestion, and postnasal drainage.  She has been taking DayQuil to manage symptoms, which she thinks is helping.  She denies any fever, chills, nausea, vomiting, sick contacts.  She thinks that this may be related to allergies.      Obesity (BMI 30-39.9)  This is a new problem to me but a chronic health problem for this patient that is uncontrolled with current medications and  lifestyle measures.  The patient reports that in February 2017 she weighed 183 pounds.  In August 2018 she weighed 192 pounds and today she weighs 201 pounds.  She has tried to change to healthy low calorie diet but she feels hungry all of the time and she is finding herself craving foods, obsessing over the foods, and then over eating the foods.  She does have a gym membership that she is not using and she did use to jog or go running with her  but she has not over the last couple months because she has no motivation to do anything but come home from work and lay on the couch.  She is inquiring about her medication that can help control her appetite in order to lose weight.    Moderate episode of recurrent major depressive disorder (HCC)  This is a new problem to me but a chronic health problem for this patient that is uncontrolled with current medications and lifestyle measures.  Patient's PHQ-9 score is 11 today.  The patient in the past has been on several different antidepressant medications, but could not handle the various side effects.  Most recently she was started on Lexapro 10 mg daily, which she says is helping significantly reduce her depression and anxiety symptoms.  She is concerned today because since starting the Lexapro she has gained some weight and feels very discouraged that she is now over 200 pounds.  In the past she would go jogging or running with her  in the evenings and she got a gym membership, but she is having trouble finding motivation to doing any that is physically demanding.  She is also finding herself craving bad foods and obsessing over eating the bad foods until she can eat the food which she then overeats.       Depression Screen (PHQ-2/PHQ-9) 5/25/2018 10/4/2018   PHQ-2 Total Score 2 2   PHQ-9 Total Score 11 11       Interpretation of PHQ-9 Total Score   Score Severity   1-4 Minimal Depression   5-9 Mild Depression   10-14 Moderate Depression   15-19 Moderately  Severe Depression   20-27 Severe Depression      Allergies: Patient has no known allergies.    Current Outpatient Prescriptions Ordered in [facility]   Medication Sig Dispense Refill   • buPROPion (WELLBUTRIN XL) 150 MG XL tablet Take 1 Tab by mouth every bedtime. 90 Tab 1   • fluticasone (FLONASE) 50 MCG/ACT nasal spray Spray 1 Spray in nose every day. 1 Bottle 1   • escitalopram (LEXAPRO) 10 MG Tab Take 1 Tab by mouth every day. 30 Tab 11     No current [facility]-ordered facility-administered medications on file.        Past Medical History:   Diagnosis Date   • Anxiety    • Depression    • Head ache     tension       History reviewed. No pertinent surgical history.    Social History   Substance Use Topics   • Smoking status: Current Every Day Smoker     Packs/day: 0.50     Years: 10.00   • Smokeless tobacco: Never Used   • Alcohol use No       Social History     Social History Narrative   • No narrative on file       Family History   Problem Relation Age of Onset   • Hypertension Mother    • Diabetes Father    • No Known Problems Sister    • Thyroid Maternal Aunt         hypo   • Diabetes Maternal Grandfather    • Stroke Neg Hx    • Heart Disease Neg Hx        ROS:     - Constitutional:  Negative for fever, chills, unexpected weight change, and fatigue/generalized weakness.     - HEENT: Positive for sinus congestion and postnasal drainage. Negative for headaches, vision changes, hearing changes, ear pain, ear discharge, rhinorrhea, sore throat, and neck pain.      - Respiratory:  Negative for cough, sputum production, chest congestion, dyspnea, wheezing, and crackles.      - Cardiovascular:  Negative for chest pain, palpitations, orthopnea, and bilateral lower extremity edema.     - Gastrointestinal:  Negative for heartburn, nausea, vomiting, abdominal pain, hematochezia, melena, diarrhea, constipation, and greasy/foul-smelling stools.     - Genitourinary:  Negative for dysuria, polyuria, hematuria, pyuria, urinary urgency,  "and urinary incontinence.     - Musculoskeletal:  Negative for myalgias, back pain, and joint pain.     - Skin:  Negative for rash, itching, cyanotic skin color change.     - Neurological:  Negative for dizziness, tingling, tremors, focal sensory deficit, focal weakness and headaches.     - Endo/Heme/Allergies:  Does not bruise/bleed easily.     - Psychiatric/Behavioral: Positive for depression and anxiety.  Negative for suicidal/homicidal ideation and memory loss.        - NOTE: All other systems reviewed and are negative, except as in HPI.      Exam: Blood pressure 126/78, pulse 90, temperature 36.7 °C (98.1 °F), temperature source Temporal, resp. rate 14, height 1.676 m (5' 6\"), weight 91.2 kg (201 lb), last menstrual period 09/24/2018, SpO2 95 %, not currently breastfeeding. Body mass index is 32.44 kg/m².    General: Normal appearing. No distress.  HEENT: Normocephalic. Eyes conjunctiva clear lids without ptosis, pupils equal and reactive to light accommodation, ears normal shape and contour, canals are clear bilaterally, tympanic membranes are benign, nasal mucosa benign, oropharynx is with erythema and postnasal drainage, no edema or exudates.   Neck: Supple without JVD or bruit. Thyroid is not enlarged.  Pulmonary: Clear to ausculation.  Normal effort. No rales, ronchi, or wheezing.  Cardiovascular: Regular rate and rhythm without murmur. Carotid and radial pulses are intact and equal bilaterally.  Neurologic: Grossly nonfocal  Lymph: No cervical, supraclavicular or axillary lymph nodes are palpable  Skin: Warm and dry.  No obvious lesions.  Musculoskeletal: Normal gait. No extremity cyanosis, clubbing, or edema.  Psych: Normal mood and affect. Alert and oriented x3. Judgment and insight is normal.  LABS: 3/29/2018: Results reviewed and discussed with the patient, questions answered.      Please note that this dictation was created using voice recognition software. I have made every reasonable attempt to " correct obvious errors, but I expect that there are errors of grammar and possibly content that I did not discover before finalizing the note.      Assessment/Plan  1. Moderate episode of recurrent major depressive disorder (HCC)  This is a chronic problem for this patient that is improving with the use of Lexapro.  However she is noticing that she is gaining weight which is causing more anxiety and depression.  Plan to continue Lexapro and add Wellbutrin 150 mg 1 time every day.  Encourage patient to increase physical activity levels as this can help with mood and weight.  - buPROPion (WELLBUTRIN XL) 150 MG XL tablet; Take 1 Tab by mouth every bedtime.  Dispense: 90 Tab; Refill: 1  - Patient has been identified as being depressed and appropriate orders and counseling have been given    2. Tension headache  This is a chronic problem for this patient, she is about 2-3 episodes per year which are generally easy to control.  She was recently seen in urgent care and was prescribed Flexeril, Mobic, Phenergan.  She ended up taking a Flexeril which did make her drowsy, but she was able to get sleep, woke up relaxed without tension in her neck and shoulders and no headache.  She still has medication left from the urgent care prescription and will let me know if she needs more.    3. Sinus congestion  This is an acute issue that is improving.  Will try Flonase 1 spray in each nostril 1 time daily, patient will also try a Ruth pot over-the-counter.  - fluticasone (FLONASE) 50 MCG/ACT nasal spray; Spray 1 Spray in nose every day.  Dispense: 1 Bottle; Refill: 1    4. Obesity (BMI 30-39.9)  This is a chronic problem that is worsening for this patient.  Her increasing weight is causing her distress. Discussed the benefits of the [employer] Health improvement program and patient is very interested in this.  Referral to the [facility] Health improvement program provided.  [facility]  Requested: [facility] Weight Management Program; Reason for Referral? BMI>30; Reason for Visit: Overweight/Obesity, BMI of 25 or higher and Fasting Glucose 100-125    5. Need for vaccination  Given today.  - PneumoVax PPV23 =>3yo    Follow-up in about 1 month.    Educated in proper administration of medication(s) ordered today including safety, possible SE, risks, benefits, rationale and alternatives to therapy.   Supportive care, differential diagnoses, and indications for immediate follow-up discussed with patient.    Pathogenesis of diagnosis discussed including typical length and natural progression.    Instructed to return to clinic or nearest emergency department for any change in condition, further concerns, or worsening of symptoms.  Patient states understanding of the plan of care and discharge instructions.      I have placed the below orders and discussed them with an approved delegating provider. The MA is performing the below orders under the direction of Dr. Vasques.

## 2018-10-04 NOTE — ASSESSMENT & PLAN NOTE
This is a new problem to me but a chronic health problem for this patient that is uncontrolled with current medications and lifestyle measures.  The patient reports that in February 2017 she weighed 183 pounds.  In August 2018 she weighed 192 pounds and today she weighs 201 pounds.  She has tried to change to healthy low calorie diet but she feels hungry all of the time and she is finding herself craving foods, obsessing over the foods, and then over eating the foods.  She does have a gym membership that she is not using and she did use to jog or go running with her  but she has not over the last couple months because she has no motivation to do anything but come home from work and lay on the couch.  She is inquiring about her medication that can help control her appetite in order to lose weight.

## 2018-10-04 NOTE — ASSESSMENT & PLAN NOTE
This is an acute issue that is improving.  Symptoms started 2 days ago with a scratchy sore throat that is now resolved.  One day ago she was having ear pain and a sensation that her ears were plugged, as well as frontal sinus pressure.  Today she no longer has a sore throat or ear pain, but she does have frontal sinus pressure, sinus congestion, and postnasal drainage.  She has been taking DayQuil to manage symptoms, which she thinks is helping.  She denies any fever, chills, nausea, vomiting, sick contacts.  She thinks that this may be related to allergies.

## 2018-11-12 ENCOUNTER — OFFICE VISIT (OUTPATIENT)
Dept: MEDICAL GROUP | Facility: PHYSICIAN GROUP | Age: 28
End: 2018-11-12
Payer: COMMERCIAL

## 2018-11-12 VITALS
DIASTOLIC BLOOD PRESSURE: 62 MMHG | OXYGEN SATURATION: 96 % | BODY MASS INDEX: 33.43 KG/M2 | SYSTOLIC BLOOD PRESSURE: 116 MMHG | HEIGHT: 66 IN | HEART RATE: 88 BPM | TEMPERATURE: 98.8 F | WEIGHT: 208 LBS

## 2018-11-12 DIAGNOSIS — G44.209 TENSION HEADACHE: Chronic | ICD-10-CM

## 2018-11-12 DIAGNOSIS — F33.1 MODERATE EPISODE OF RECURRENT MAJOR DEPRESSIVE DISORDER (HCC): Chronic | ICD-10-CM

## 2018-11-12 DIAGNOSIS — F41.9 ANXIETY: Chronic | ICD-10-CM

## 2018-11-12 DIAGNOSIS — L20.84 INTRINSIC ECZEMA: ICD-10-CM

## 2018-11-12 DIAGNOSIS — Z23 NEED FOR VACCINATION: ICD-10-CM

## 2018-11-12 DIAGNOSIS — E66.9 OBESITY (BMI 30-39.9): Chronic | ICD-10-CM

## 2018-11-12 PROBLEM — R09.81 SINUS CONGESTION: Status: RESOLVED | Noted: 2018-10-04 | Resolved: 2018-11-12

## 2018-11-12 PROCEDURE — 99214 OFFICE O/P EST MOD 30 MIN: CPT | Mod: 25 | Performed by: NURSE PRACTITIONER

## 2018-11-12 PROCEDURE — 90715 TDAP VACCINE 7 YRS/> IM: CPT | Performed by: NURSE PRACTITIONER

## 2018-11-12 PROCEDURE — 90471 IMMUNIZATION ADMIN: CPT | Performed by: NURSE PRACTITIONER

## 2018-11-12 RX ORDER — CYCLOBENZAPRINE HCL 10 MG
10 TABLET ORAL NIGHTLY PRN
Qty: 30 TAB | Refills: 11 | Status: SHIPPED | OUTPATIENT
Start: 2018-11-12

## 2018-11-12 RX ORDER — PHENTERMINE HYDROCHLORIDE 30 MG/1
30 CAPSULE ORAL EVERY MORNING
Qty: 30 CAP | Refills: 0 | Status: SHIPPED | OUTPATIENT
Start: 2018-11-12 | End: 2018-12-12

## 2018-11-12 RX ORDER — TRIAMCINOLONE ACETONIDE 1 MG/G
1 CREAM TOPICAL 2 TIMES DAILY
Qty: 2 TUBE | Refills: 3 | Status: SHIPPED | OUTPATIENT
Start: 2018-11-12

## 2018-11-12 RX ORDER — ESCITALOPRAM OXALATE 5 MG/1
5 TABLET ORAL DAILY
Qty: 30 TAB | Refills: 11 | Status: SHIPPED | OUTPATIENT
Start: 2018-11-12 | End: 2018-12-12
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
## 2018-11-12 NOTE — ASSESSMENT & PLAN NOTE
This is a chronic problem for the patient that is improving.  The patient continues to take Lexapro 10 mg daily and Wellbutrin 150 mg daily.  She denies any side effects of these medications.  She reports that she is not feeling as depressed or anxious as she was 1 month ago.  She does feel like she has no motivation, lazy, and does not want to do anything.  She has gained 7 pounds in the month since I saw her last, which makes her sad.  She does have a gym membership that she is not using for 2 reasons: 1 she has no motivation and 2 she does not want to go to a gym when she feels fat.  She denies any suicidal ideations.

## 2018-11-12 NOTE — ASSESSMENT & PLAN NOTE
This is a chronic problem for the patient that is stable at the moment.  The patient has not had to go back to urgent care for a headache.  She does report that she is very anxious and stressed person and she carries at all in her shoulders and neck which directly causes her headaches.  At her last urgent care visit she was prescribed Flexeril to be taken nightly as needed, which she has been taking and states that it is improving the tension in her shoulders and neck as well as improving her sleep.

## 2018-11-12 NOTE — ASSESSMENT & PLAN NOTE
This is a chronic problem for the patient that is uncontrolled.  The patient's weight today is 208 pounds with a BMI of 33.57.  1 month ago she weighed 201 pounds with BMI of 32.44. Her low weight was in February 2017 at 183 pounds.  She reports that her diet is still very poor and that she will find herself obsessing over foods that she is craving and then over eating these foods when she gets them.  She also continues to not participate exercise, due to low motivation, she does however still have her gym membership.

## 2018-11-12 NOTE — ASSESSMENT & PLAN NOTE
This is a chronic problem for the patient that is uncontrolled during winter months.  The patient reports a rash to bilateral axilla upper arm area that occurs every winter.  She has not changed any deodorants, soaps, lotions, laundry soap.  She does apply over-the-counter hydrocortisone cream to the areas which decrease the itching but does not improve the rash.

## 2018-12-12 ENCOUNTER — OFFICE VISIT (OUTPATIENT)
Dept: MEDICAL GROUP | Facility: PHYSICIAN GROUP | Age: 28
End: 2018-12-12
Payer: COMMERCIAL

## 2018-12-12 VITALS
OXYGEN SATURATION: 95 % | BODY MASS INDEX: 31.82 KG/M2 | WEIGHT: 198 LBS | DIASTOLIC BLOOD PRESSURE: 72 MMHG | TEMPERATURE: 98.2 F | SYSTOLIC BLOOD PRESSURE: 118 MMHG | HEART RATE: 86 BPM | HEIGHT: 66 IN

## 2018-12-12 DIAGNOSIS — L20.84 INTRINSIC ECZEMA: ICD-10-CM

## 2018-12-12 DIAGNOSIS — G44.209 TENSION HEADACHE: Chronic | ICD-10-CM

## 2018-12-12 DIAGNOSIS — E66.9 OBESITY (BMI 30-39.9): Chronic | ICD-10-CM

## 2018-12-12 DIAGNOSIS — F41.9 ANXIETY: Chronic | ICD-10-CM

## 2018-12-12 DIAGNOSIS — F33.1 MODERATE EPISODE OF RECURRENT MAJOR DEPRESSIVE DISORDER (HCC): Chronic | ICD-10-CM

## 2018-12-12 PROCEDURE — 99214 OFFICE O/P EST MOD 30 MIN: CPT | Performed by: NURSE PRACTITIONER

## 2018-12-12 RX ORDER — PHENTERMINE HYDROCHLORIDE 37.5 MG/1
37.5 CAPSULE ORAL EVERY MORNING
Qty: 30 CAP | Refills: 0 | Status: SHIPPED | OUTPATIENT
Start: 2019-01-11 | End: 2019-02-10

## 2018-12-12 RX ORDER — PHENTERMINE HYDROCHLORIDE 37.5 MG/1
37.5 CAPSULE ORAL EVERY MORNING
Qty: 30 CAP | Refills: 0 | Status: SHIPPED | OUTPATIENT
Start: 2018-12-12 | End: 2019-01-11

## 2018-12-12 RX ORDER — ESCITALOPRAM OXALATE 20 MG/1
20 TABLET ORAL DAILY
Qty: 90 TAB | Refills: 3 | Status: SHIPPED | OUTPATIENT
Start: 2018-12-12

## 2018-12-12 NOTE — ASSESSMENT & PLAN NOTE
This is a chronic problem for the patient that is improving.  The patient reported a rash to bilateral axilla upper arm area that occurs every winter.  She had applied an over-the-counter hydrocortisone cream which is not helping.  A prescription was given to her last month for Kenalog 0.1% cream which she applied to her arms and elbows, stating the rash on her arms is completely resolved and her elbows are almost resolved.

## 2018-12-12 NOTE — ASSESSMENT & PLAN NOTE
This is a chronic problem for the patient that is improving.  When I saw the patient on 11/12/2018 her weight was 208 pounds with a BMI of 33.57.  1 month ago she started on phentermine 30 mg daily which she reports has helped immensely with her appetite and energy.  She has adjusted her diet to low carbs, no sugars, no bread.  The patient's weight today is 198 pounds with a BMI of 31.96.  The patient reports that she has been more active over the last month.  They just moved into a house in [place] (they were living in an apartment complex in [place]), and now that she lives in the neighborhood she is able to walk her dog consistently.  With the increased energy in the daytime, change in her diet, and creating and sticking to a new routine she is going to bed and getting up in the morning at the same time and averaging 8 hours of sleep which is helping a lot with her mood and energy.  The patient reports that before she would get up at the last minute to go to work and was rushing, now she gets up early and has time which leads to less anxiety.  She denies any side effects from the phentermine other than dry mouth.  She reports more energy but does not believe it is excessive, she has not had any chest pain, palpitations, increased blood pressure, or difficulty sleeping.  She denies that this medication is increasing her anxiety in any way.  She reports that she knows she needs to drink more water, she just bought a new water bottle that is 32 ounces and she is trying to drink 2/day, which she is finding difficult.  She is considering setting and christy on her smart watch to help her with increasing her water intake.  She is interested in continuing the phentermine and increasing the dose.   Obtained and reviewed the patient utilization report state pharmacy database on 12/12/2018.  Based on assessment of report prescription for phentermine is medically necessary.

## 2018-12-12 NOTE — ASSESSMENT & PLAN NOTE
This is a chronic problem for the patient that is improving.  The patient has been on Lexapro 15 mg/day for the last month and would like to increase to 20 mg/day.  She is also taking Wellbutrin 150 mg/day which was very helpful in her quitting smoking.  The patient reports that she is not feeling as depressed or anxious as she has been recently.  She is finding more motivation and is helping out around her house more.  She has lost 10 pounds in the last month with diet changes, medication, increasing physical activity which is making her feel better.  She continues to deny any suicidal ideations.
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
## 2019-02-12 ENCOUNTER — OFFICE VISIT (OUTPATIENT)
Dept: MEDICAL GROUP | Facility: PHYSICIAN GROUP | Age: 29
End: 2019-02-12
Payer: COMMERCIAL

## 2019-02-12 VITALS
HEIGHT: 66 IN | TEMPERATURE: 98.9 F | SYSTOLIC BLOOD PRESSURE: 122 MMHG | HEART RATE: 90 BPM | WEIGHT: 175 LBS | OXYGEN SATURATION: 96 % | BODY MASS INDEX: 28.12 KG/M2 | DIASTOLIC BLOOD PRESSURE: 78 MMHG

## 2019-02-12 DIAGNOSIS — F41.9 ANXIETY: Chronic | ICD-10-CM

## 2019-02-12 DIAGNOSIS — G44.209 TENSION HEADACHE: Chronic | ICD-10-CM

## 2019-02-12 DIAGNOSIS — L20.84 INTRINSIC ECZEMA: ICD-10-CM

## 2019-02-12 DIAGNOSIS — K59.01 SLOW TRANSIT CONSTIPATION: ICD-10-CM

## 2019-02-12 DIAGNOSIS — E66.3 OVERWEIGHT (BMI 25.0-29.9): ICD-10-CM

## 2019-02-12 DIAGNOSIS — Z30.011 ENCOUNTER FOR INITIAL PRESCRIPTION OF CONTRACEPTIVE PILLS: ICD-10-CM

## 2019-02-12 DIAGNOSIS — F33.1 MODERATE EPISODE OF RECURRENT MAJOR DEPRESSIVE DISORDER (HCC): Chronic | ICD-10-CM

## 2019-02-12 LAB
INT CON NEG: NEGATIVE
INT CON POS: POSITIVE
POC URINE PREGNANCY TEST: NORMAL

## 2019-02-12 PROCEDURE — 99214 OFFICE O/P EST MOD 30 MIN: CPT | Performed by: NURSE PRACTITIONER

## 2019-02-12 PROCEDURE — 81025 URINE PREGNANCY TEST: CPT | Performed by: NURSE PRACTITIONER

## 2019-02-12 RX ORDER — PHENTERMINE HYDROCHLORIDE 37.5 MG/1
37.5 CAPSULE ORAL EVERY MORNING
Qty: 30 CAP | Refills: 0 | Status: SHIPPED | OUTPATIENT
Start: 2019-02-12 | End: 2019-03-14

## 2019-02-12 RX ORDER — NORGESTIMATE AND ETHINYL ESTRADIOL 7DAYSX3 LO
1 KIT ORAL DAILY
Qty: 84 TAB | Refills: 3 | Status: SHIPPED | OUTPATIENT
Start: 2019-02-12

## 2019-02-12 NOTE — ASSESSMENT & PLAN NOTE
"This is a chronic problem for the patient that is uncontrolled.  Patient states that she continues to have tension in her shoulders and neck which always leads to a tension headache.  The patient has not needed to come to urgent care for headaches and manages this with Flexeril 5-10 mg as needed nightly.  Patient states that when she has days off she is not experiencing these headaches.  Patient believes that her computer work, and stress at work increase her tension directly increasing the frequency of her headaches.  Patient states that her headaches are not more than she can handle and she notices when she starts to \"feel heavy\" at work and will get up and walk around.  "

## 2019-02-12 NOTE — ASSESSMENT & PLAN NOTE
This is a chronic problem for the patient that is stable.  The patient had a rash to bilateral axilla upper arm areas that is well managed with Kenalog 0.1% cream.  Patient states she recently had a flare on bilateral forearms which resolved quickly after applying Kenalog.

## 2019-02-12 NOTE — ASSESSMENT & PLAN NOTE
This is a chronic problem for the patient that is stable.  Patient continues to take Lexapro 20 mg/day and states that this medication and dose is effective in managing her anxiety symptoms.  In December 2018 the patient's mother lost her house in [facility] [place] [facility] and had moved in with the patient, which was intensifying the patient's anxiety and depression.  The patient's mom has since moved with other family members in [facility] which has provided a lot of relief to the patient's anxiety.  The patient reports that she is still having headaches related to tension and anxiety, but she believes these headaches are more related to stress at work as she does not have them on days off.

## 2019-02-12 NOTE — ASSESSMENT & PLAN NOTE
"This is a chronic problem for the patient that is stable.  The patient continues to take Lexapro 20 mg/day and Wellbutrin 150 mg/day, denies any side effects of these medications. The patient initially started taking Wellbutrin to help with smoking cessation, which she was successful at, but it has also helped improve and stabilize her mood \"immensely\". She would like to continue with both medications without any dosage changes. The patient denies any suicidal ideations or thoughts of harming herself or others.  "

## 2019-02-12 NOTE — ASSESSMENT & PLAN NOTE
This is a chronic problem for the patient that is improving.  The patient's starting weight on 11/12/2018 was 208 pounds with a BMI of 33.57.  The patient has used phentermine for the past 3 months which has helped control her appetite and assist her with making healthy lifestyle changes.  Patient has adjusted to her diet of low carbs, low sugars.  She has also been more active after moving to a house in [place], she will take her dog on a walk every evening.  The patient denies any side effects from the medication other than a dry mouth which is rare.  She denies any chest pain, palpitations, increased blood pressure, or difficulty sleeping.  Patient states that her sleep has actually improved.  The patient's weight today is 175 pounds with a BMI of 28.25.  Patient states her initial goal weight was 170 pounds, but she thinks she may be happier at 160-165 pounds.  Patient states that she still has a gym membership, but she does not go due to lack of time.  Patient would like to try 1 more month of phentermine and try to see how she does keeping days of the medication.    Obtained and reviewed the patient utilization report state pharmacy database on 2/12/2019.  Based on assessment of report prescription for phentermine is medically necessary.

## 2019-02-12 NOTE — ASSESSMENT & PLAN NOTE
This is a chronic problem for the patient that is ongoing.  Patient states that she has had issues with constipation for the majority of her life.  Reports that she is unable to go to the bathroom unless she is at home, which she thinks contributes to the issue as she feels the urge when away from home and will delay the bowel movement.  Patient states that she frequently goes 5+ days without a bowel movement and will then use Colace.  The patient is concerned about using Colace too often.  Patient states that she has also tried over-the-counter magnesium citrate, other stool softeners, supplemental fiber such as Metamucil, and MiraLAX.  She is also decreased carbs and increased fresh fruits and vegetables as well as water intake but continues to have this problem.

## 2019-02-13 NOTE — PROGRESS NOTES
"CC:   Chief Complaint   Patient presents with   • Depression   • Anxiety   • Obesity       HISTORY OF THE PRESENT ILLNESS: Patient is a 28 y.o. female. This pleasant patient is here today to follow-up on obesity, depression, and anxiety.    Health Maintenance: Completed    Moderate episode of recurrent major depressive disorder (HCC)  This is a chronic problem for the patient that is stable.  The patient continues to take Lexapro 20 mg/day and Wellbutrin 150 mg/day, denies any side effects of these medications. The patient initially started taking Wellbutrin to help with smoking cessation, which she was successful at, but it has also helped improve and stabilize her mood \"immensely\". She would like to continue with both medications without any dosage changes. The patient denies any suicidal ideations or thoughts of harming herself or others.    Anxiety  This is a chronic problem for the patient that is stable.  Patient continues to take Lexapro 20 mg/day and states that this medication and dose is effective in managing her anxiety symptoms.  In December 2018 the patient's mother lost her house in 1 of the [facility] and had moved in with the patient, which was intensifying the patient's anxiety and depression.  The patient's mom has since moved with other family members in [facility] which has provided a lot of relief to the patient's anxiety.  The patient reports that she is still having headaches related to tension and anxiety, but she believes these headaches are more related to stress at work as she does not have them on days off.    Tension headache  This is a chronic problem for the patient that is uncontrolled.  Patient states that she continues to have tension in her shoulders and neck which always leads to a tension headache.  The patient has not needed to come to urgent care for headaches and manages this with Flexeril 5-10 mg as needed nightly.  Patient states that when she has days off " "she is not experiencing these headaches.  Patient believes that her computer work, and stress at work increase her tension directly increasing the frequency of her headaches.  Patient states that her headaches are not more than she can handle and she notices when she starts to \"feel heavy\" at work and will get up and walk around.    Intrinsic eczema  This is a chronic problem for the patient that is stable.  The patient had a rash to bilateral axilla upper arm areas that is well managed with Kenalog 0.1% cream.  Patient states she recently had a flare on bilateral forearms which resolved quickly after applying Kenalog.    Overweight (BMI 25.0-29.9)  This is a chronic problem for the patient that is improving.  The patient's starting weight on 11/12/2018 was 208 pounds with a BMI of 33.57.  The patient has used phentermine for the past 3 months which has helped control her appetite and assist her with making healthy lifestyle changes.  Patient has adjusted to her diet of low carbs, low sugars.  She has also been more active after moving to a house in [place], she will take her dog on a walk every evening.  The patient denies any side effects from the medication other than a dry mouth which is rare.  She denies any chest pain, palpitations, increased blood pressure, or difficulty sleeping.  Patient states that her sleep has actually improved.  The patient's weight today is 175 pounds with a BMI of 28.25.  Patient states her initial goal weight was 170 pounds, but she thinks she may be happier at 160-165 pounds.  Patient states that she still has a gym membership, but she does not go due to lack of time.  Patient would like to try 1 more month of phentermine and try to see how she does keeping days of the medication.    Obtained and reviewed the patient utilization report state pharmacy database on 2/12/2019.  Based on assessment of report prescription for phentermine is medically necessary.    Slow transit " constipation  This is a chronic problem for the patient that is ongoing.  Patient states that she has had issues with constipation for the majority of her life.  Reports that she is unable to go to the bathroom unless she is at home, which she thinks contributes to the issue as she feels the urge when away from home and will delay the bowel movement.  Patient states that she frequently goes 5+ days without a bowel movement and will then use Colace.  The patient is concerned about using Colace too often.  Patient states that she has also tried over-the-counter magnesium citrate, other stool softeners, supplemental fiber such as Metamucil, and MiraLAX.  She is also decreased carbs and increased fresh fruits and vegetables as well as water intake but continues to have this problem.    Allergies: Patient has no known allergies.    Current Outpatient Prescriptions Ordered in [facility]   Medication Sig Dispense Refill   • phentermine 37.5 MG capsule Take 1 Cap by mouth every morning for 30 days. 30 Cap 0   • Norgestim-Eth Estrad Triphasic 0.18/0.215/0.25 MG-25 MCG Tab Take 1 Tab by mouth every day. 84 Tab 3   • escitalopram (LEXAPRO) 20 MG tablet Take 1 Tab by mouth every day. 90 Tab 3   • buPROPion (WELLBUTRIN XL) 150 MG XL tablet Take 1 Tab by mouth every bedtime. 90 Tab 1   • triamcinolone acetonide (KENALOG) 0.1 % Cream Apply 1 Application to affected area(s) 2 times a day. 2 Tube 3   • cyclobenzaprine (FLEXERIL) 10 MG Tab Take 1 Tab by mouth at bedtime as needed. 30 Tab 11   • fluticasone (FLONASE) 50 MCG/ACT nasal spray Spray 1 Spray in nose every day. 1 Bottle 1     No current Epic-ordered facility-administered medications on file.        Past Medical History:   Diagnosis Date   • Anxiety    • Depression    • Head ache     tension       History reviewed. No pertinent surgical history.    Social History   Substance Use Topics   • Smoking status: Former Smoker     Packs/day: 0.50     Years: 10.00     Quit date: 10/8/2018    • Smokeless tobacco: Never Used      Comment: Stopped after starting Wellbutrin   • Alcohol use No       Social History     Social History Narrative   • No narrative on file       Family History   Problem Relation Age of Onset   • Hypertension Mother    • Alcohol/Drug Mother    • Obesity Mother    • Diabetes Father    • Obesity Father    • GI Sister         GERD   • Thyroid Maternal Aunt         hypo   • Diabetes Maternal Grandfather    • Obesity Maternal Grandfather    • Hypertension Maternal Grandfather    • No Known Problems Brother    • Hyperlipidemia Maternal Grandmother    • No Known Problems Sister    • No Known Problems Sister    • No Known Problems Sister    • Breast Cancer Other    • Stroke Neg Hx    • Heart Disease Neg Hx        ROS:   Constitutional:  Negative for fever, chills, unexpected weight change, night sweats, body aches, sleep issues,  and fatigue/generalized weakness.   HEENT: Positive for tension headaches.  Negative for vision changes, hearing changes, ear pain, tinnitus, ear discharge, rhinorrhea, sinus congestion, sneezing, sore throat, and neck pain.    Respiratory:  Negative for cough, shortness of breath, sputum production, hemoptysis, chest congestion, dyspnea, wheezing, and crackles.    Cardiovascular:  Negative for chest pain, palpitations, TREVIZO, paroxsymal nocturnal dyspnea, orthopnea, and bilateral lower extremity edema.   Gastrointestinal: Positive for constipation.  Negative for heartburn, nausea, vomiting, abdominal pain, hematochezia, melena, diarrhea, and greasy/foul-smelling stools.   Genitourinary:  Negative for dysuria, nocturia, polyuria, hematuria, pyuria, urinary urgency, urinary frequency, and urinary incontinence.   Musculoskeletal:  Negative for myalgias, back pain, and joint pain.   Skin:  Negative for rash, sores, lumps, itching, cyanotic skin color change.   Neurological: Positive for headaches.  Negative for dizziness, tingling, tremors, focal sensory deficit,  "focal weakness.   Endo/Heme/Allergies:  Does not bruise/bleed easily. Denies cold/heat intolerance.   Psychiatric/Behavioral: Positive for improving depression and anxiety, negative for suicidal/homicidal ideation and memory loss.        Exam: Blood pressure 122/78, pulse 90, temperature 37.2 °C (98.9 °F), temperature source Temporal, height 1.676 m (5' 6\"), weight 79.4 kg (175 lb), SpO2 96 %, not currently breastfeeding. Body mass index is 28.25 kg/m².    General:  Normal appearing. No distress.  HEENT:  Normocephalic. Eyes conjunctiva clear lids without ptosis, pupils equal and reactive to light accommodation, ears normal shape and contour.   Pulmonary:  Clear to ausculation.  Normal effort. No rales, ronchi, or wheezing.  Cardiovascular:  Regular rate and rhythm without murmur.  Abdomen:  Soft, nontender, nondistended. Normal bowel sounds.   Neurologic:  Grossly nonfocal.  Skin:  Warm and dry.  No obvious lesions.  Musculoskeletal:  Normal gait. No extremity cyanosis, clubbing, or edema.  Psych:  Normal mood and affect. Alert and oriented x3. Judgment and insight is normal.    Assessment/Plan  1. Overweight (BMI 25.0-29.9)  phentermine 37.5 MG capsule    Comp Metabolic Panel    Lipid Profile   2. Moderate episode of recurrent major depressive disorder (HCC)   continue Lexapro 20 mg daily and Wellbutrin  mg daily   3. Anxiety  continue Lexapro 20 mg daily and Wellbutrin  mg daily   4. Tension headache   continue Flexeril 5-10 mg nightly as needed   5. Intrinsic eczema   continue Kenalog 0.1% cream as needed   6. Slow transit constipation   increase water intake  Try over-the-counter senna   7. Encounter for initial prescription of contraceptive pills  POCT Pregnancy -negative    Norgestim-Eth Estrad Triphasic 0.18/0.215/0.25 MG-25 MCG Tab     Educated in proper administration of medication(s) ordered today including safety, possible SE, risks, benefits, rationale and alternatives to therapy. "   Supportive care, differential diagnoses, and indications for immediate follow-up discussed with patient.    Pathogenesis of diagnosis discussed including typical length and natural progression.    Instructed to return to clinic or nearest emergency department for any change in condition, further concerns, or worsening of symptoms.  Patient states understanding of the plan of care and discharge instructions.    Return in about 1 month (around 3/12/2019) for Depression, Follow up Medications.    Please note that this dictation was created using voice recognition software. I have made every reasonable attempt to correct obvious errors, but I expect that there are errors of grammar and possibly content that I did not discover before finalizing the note.

## 2019-04-12 ENCOUNTER — OFFICE VISIT (OUTPATIENT)
Dept: URGENT CARE | Facility: PHYSICIAN GROUP | Age: 29
End: 2019-04-12
Payer: COMMERCIAL

## 2019-04-12 VITALS
TEMPERATURE: 97.6 F | OXYGEN SATURATION: 96 % | SYSTOLIC BLOOD PRESSURE: 112 MMHG | DIASTOLIC BLOOD PRESSURE: 78 MMHG | HEART RATE: 76 BPM | WEIGHT: 176 LBS | BODY MASS INDEX: 28.41 KG/M2 | RESPIRATION RATE: 16 BRPM

## 2019-04-12 DIAGNOSIS — J06.9 URI WITH COUGH AND CONGESTION: ICD-10-CM

## 2019-04-12 DIAGNOSIS — H65.93 FLUID LEVEL BEHIND TYMPANIC MEMBRANE OF BOTH EARS: ICD-10-CM

## 2019-04-12 DIAGNOSIS — J02.9 SORE THROAT: ICD-10-CM

## 2019-04-12 LAB
INT CON NEG: NORMAL
INT CON POS: NORMAL
S PYO AG THROAT QL: NORMAL

## 2019-04-12 PROCEDURE — 99214 OFFICE O/P EST MOD 30 MIN: CPT | Performed by: PHYSICIAN ASSISTANT

## 2019-04-12 PROCEDURE — 87880 STREP A ASSAY W/OPTIC: CPT | Performed by: PHYSICIAN ASSISTANT

## 2019-04-12 RX ORDER — CHLORPHENIRAMINE MALEATE 4 MG/1
4 TABLET ORAL EVERY 6 HOURS PRN
Qty: 30 TAB | Refills: 0 | Status: SHIPPED | OUTPATIENT
Start: 2019-04-12 | End: 2019-04-30

## 2019-04-12 RX ORDER — FLUTICASONE PROPIONATE 50 MCG
1 SPRAY, SUSPENSION (ML) NASAL 2 TIMES DAILY
Qty: 1 BOTTLE | Refills: 0 | Status: SHIPPED | OUTPATIENT
Start: 2019-04-12 | End: 2019-04-30

## 2019-04-12 NOTE — PROGRESS NOTES
Chief Complaint   Patient presents with   • Sore Throat     exposed to strep    • Nasal Congestion       HISTORY OF PRESENT ILLNESS: Patient is a 28 y.o. female who presents today for the following:    ST started yesterday  + mild body aches, nasal congestion, very tired, mild cough, chills  OTC meds today: none  + strep exposure    Patient Active Problem List    Diagnosis Date Noted   • Slow transit constipation 02/12/2019   • Intrinsic eczema 11/12/2018   • Tension headache 10/04/2018   • Moderate episode of recurrent major depressive disorder (HCC) 05/25/2018   • Overweight (BMI 25.0-29.9) 05/25/2018   • Anxiety 05/25/2018       Allergies:Patient has no known allergies.    Current Outpatient Prescriptions Ordered in [facility]   Medication Sig Dispense Refill   • fluticasone (FLONASE) 50 MCG/ACT nasal spray Spray 1 Spray in nose 2 times a day. 1 Bottle 0   • chlorpheniramine (CHLOR-TRIMETRON) 4 MG Tab Take 1 Tab by mouth every 6 hours as needed (nasal congestion/ear pressure). 30 Tab 0   • Norgestim-Eth Estrad Triphasic 0.18/0.215/0.25 MG-25 MCG Tab Take 1 Tab by mouth every day. 84 Tab 3   • buPROPion (WELLBUTRIN XL) 150 MG XL tablet Take 1 Tab by mouth every bedtime. 90 Tab 1   • escitalopram (LEXAPRO) 20 MG tablet Take 1 Tab by mouth every day. (Patient not taking: Reported on 4/12/2019) 90 Tab 3   • triamcinolone acetonide (KENALOG) 0.1 % Cream Apply 1 Application to affected area(s) 2 times a day. (Patient not taking: Reported on 4/12/2019) 2 Tube 3   • cyclobenzaprine (FLEXERIL) 10 MG Tab Take 1 Tab by mouth at bedtime as needed. (Patient not taking: Reported on 4/12/2019) 30 Tab 11   • fluticasone (FLONASE) 50 MCG/ACT nasal spray Spray 1 Spray in nose every day. (Patient not taking: Reported on 4/12/2019) 1 Bottle 1     No current Epic-ordered facility-administered medications on file.        Past Medical History:   Diagnosis Date   • Anxiety    • Depression    • Head ache     tension       Social History    Substance Use Topics   • Smoking status: Former Smoker     Packs/day: 0.50     Years: 10.00     Quit date: 10/8/2018   • Smokeless tobacco: Never Used      Comment: Stopped after starting Wellbutrin   • Alcohol use No       Family Status   Relation Status   • Mo Alive, age 47y   • Fa Alive, age 50y   • Sis Alive, age 27y   • MAunt Alive   • MGFa Alive, age 67y   • Bro Alive, age 25y   • MGMo Alive, age 67y   • PGMo Other   • PGFa Other   • Sis Alive, age 24y   • Sis Alive, age 22y   • Sis Alive, age 22y   • OTHER    • Neg Hx (Not Specified)     Family History   Problem Relation Age of Onset   • Hypertension Mother    • Alcohol/Drug Mother    • Obesity Mother    • Diabetes Father    • Obesity Father    • GI Sister         GERD   • Thyroid Maternal Aunt         hypo   • Diabetes Maternal Grandfather    • Obesity Maternal Grandfather    • Hypertension Maternal Grandfather    • No Known Problems Brother    • Hyperlipidemia Maternal Grandmother    • No Known Problems Sister    • No Known Problems Sister    • No Known Problems Sister    • Breast Cancer Other    • Stroke Neg Hx    • Heart Disease Neg Hx        Review of Systems:    Constitutional ROS: No unexpected change in weight, No weakness, No fatigue  Eye ROS: No recent significant change in vision, No eye pain, redness, discharge  Ear ROS: No drainage, No tinnitus or vertigo, No recent change in hearing  Mouth/Throat ROS: No teeth or gum problems, No bleeding gums, No tongue complaints  Neck ROS: No swollen glands, No significant pain in neck  Pulmonary ROS: No chronic cough, sputum, or hemoptysis, No dyspnea on exertion, No wheezing  Cardiovascular ROS: No diaphoresis, No edema, No palpitations  Gastrointestinal ROS: No change in bowel habits, No significant change in appetite, No nausea, vomiting, diarrhea, or constipation  Musculoskeletal/Extremities ROS: No peripheral edema, No pain, redness or swelling on the joints  Hematologic/Lymphatic ROS: No  chills, No night sweats, No weight loss  Skin/Integumentary ROS: No edema, No evidence of rash, No itching      Exam:  /78   Pulse 76   Temp 36.4 °C (97.6 °F) (Temporal)   Resp 16   Wt 79.8 kg (176 lb)   SpO2 96%   General: Well developed, well nourished. No distress.  Eye: PERRL/EOMI; conjunctivae clear, lids normal.  ENMT: Lips without lesions, MMM. Oropharynx is clear. Bilateral TMs are within normal limits but with clear fluid posteriorly bilaterally and bulging.  Pulmonary: Unlabored respiratory effort. Lungs clear to auscultation, no wheezes, no rhonchi.  Cardiovascular: Regular rate and rhythm without murmur.    Neurologic: Grossly nonfocal. No facial asymmetry noted.  Lymph: No cervical lymphadenopathy noted.  Skin: Warm, dry, good turgor. No rashes in visible areas.   Psych: Normal mood. Alert and oriented x3. Judgment and insight is normal.    Rapid strep: negative     Assessment/Plan:  Discussed likely viral etiology. Discussed appropriate over-the-counter symptomatic medication, and when to return to clinic.  Use all medication as directed.  Follow up for worsening or persistent symptoms.  1. URI with cough and congestion     2. Fluid level behind tympanic membrane of both ears  fluticasone (FLONASE) 50 MCG/ACT nasal spray    chlorpheniramine (CHLOR-TRIMETRON) 4 MG Tab   3. Sore throat  POCT Rapid Strep A

## 2019-04-30 ENCOUNTER — OFFICE VISIT (OUTPATIENT)
Dept: MEDICAL GROUP | Facility: PHYSICIAN GROUP | Age: 29
End: 2019-04-30
Payer: COMMERCIAL

## 2019-04-30 VITALS
SYSTOLIC BLOOD PRESSURE: 116 MMHG | OXYGEN SATURATION: 95 % | HEART RATE: 110 BPM | HEIGHT: 66 IN | TEMPERATURE: 97.8 F | WEIGHT: 180 LBS | DIASTOLIC BLOOD PRESSURE: 72 MMHG | BODY MASS INDEX: 28.93 KG/M2

## 2019-04-30 DIAGNOSIS — F41.9 ANXIETY: Chronic | ICD-10-CM

## 2019-04-30 DIAGNOSIS — E66.3 OVERWEIGHT (BMI 25.0-29.9): ICD-10-CM

## 2019-04-30 PROCEDURE — 99213 OFFICE O/P EST LOW 20 MIN: CPT | Performed by: NURSE PRACTITIONER

## 2019-04-30 NOTE — ASSESSMENT & PLAN NOTE
This is a chronic problem for the patient that is ongoing.  The patient's weight today is 180 pounds with a BMI of 29.05.  The patient was last seen by me on 2/12/2018 with a weight of 175 pounds and was seen in urgent care on 4/12/2019 with a weight of 176 pounds.  The patient was previously taking phentermine to help with weight loss, starting in November 2018 with a starting weight of 208 pounds with a BMI of 33.57.  The patient has joined a softball team and participates in practice 3 times per week as well as games.  Patient states that she feels good and feels stronger and is pleased with her progress.

## 2019-04-30 NOTE — ASSESSMENT & PLAN NOTE
"This is a chronic problem for the patient that is uncontrolled. The patient continues to take lexapro 20 mg/day and states that in general the medication is effective in managing her anxiety symptoms. The patient states that she is having difficulty with staying focused on projects at work, which is increasing her anxiety and stress levels, which directly worsens her tension headaches.  The patient states that she feels \"all over the place\" and she spends a lot of time thinking and worrying about stuff.  Patient states that she has a lot of pressure on her at work and she feels \"really busy but not getting things done\".  Patient admits that it is hard for her to say no and she takes on more than she can handle, states she is working on saying no or not right now when people request things of her.  The patient does agree that she needs help learning how to set boundaries with her coworkers.  The patient has not seen a therapist for this issue, states that her job will offer therapy assistance and she will plan to call to schedule.  "

## 2019-04-30 NOTE — PROGRESS NOTES
"CC:   Chief Complaint   Patient presents with   • Weight Loss     follow up    • Anxiety       HISTORY OF THE PRESENT ILLNESS: Patient is a 28 y.o. female. This pleasant patient is here today to follow-up on obesity and anxiety.    Health Maintenance: Completed    Overweight (BMI 25.0-29.9)  This is a chronic problem for the patient that is ongoing.  The patient's weight today is 180 pounds with a BMI of 29.05.  The patient was last seen by me on 2/12/2018 with a weight of 175 pounds and was seen in urgent care on 4/12/2019 with a weight of 176 pounds.  The patient was previously taking phentermine to help with weight loss, starting in November 2018 with a starting weight of 208 pounds with a BMI of 33.57.  The patient has joined a softball team and participates in practice 3 times per week as well as games.  Patient states that she feels good and feels stronger and is pleased with her progress.    Anxiety  This is a chronic problem for the patient that is uncontrolled. The patient continues to take lexapro 20 mg/day and states that in general the medication is effective in managing her anxiety symptoms. The patient states that she is having difficulty with staying focused on projects at work, which is increasing her anxiety and stress levels, which directly worsens her tension headaches.  The patient states that she feels \"all over the place\" and she spends a lot of time thinking and worrying about stuff.  Patient states that she has a lot of pressure on her at work and she feels \"really busy but not getting things done\".  Patient admits that it is hard for her to say no and she takes on more than she can handle, states she is working on saying no or not right now when people request things of her.  The patient does agree that she needs help learning how to set boundaries with her coworkers.  The patient has not seen a therapist for this issue, states that her job will offer therapy assistance and she will plan to " call to schedule.    Allergies: Patient has no known allergies.    Current Outpatient Prescriptions Ordered in [facility]   Medication Sig Dispense Refill   • Norgestim-Eth Estrad Triphasic 0.18/0.215/0.25 MG-25 MCG Tab Take 1 Tab by mouth every day. 84 Tab 3   • escitalopram (LEXAPRO) 20 MG tablet Take 1 Tab by mouth every day. 90 Tab 3   • cyclobenzaprine (FLEXERIL) 10 MG Tab Take 1 Tab by mouth at bedtime as needed. 30 Tab 11   • buPROPion (WELLBUTRIN XL) 150 MG XL tablet Take 1 Tab by mouth every bedtime. 90 Tab 1   • triamcinolone acetonide (KENALOG) 0.1 % Cream Apply 1 Application to affected area(s) 2 times a day. (Patient not taking: Reported on 4/12/2019) 2 Tube 3     No current Epic-ordered facility-administered medications on file.        Past Medical History:   Diagnosis Date   • Anxiety    • Depression    • Head ache     tension       History reviewed. No pertinent surgical history.    Social History   Substance Use Topics   • Smoking status: Former Smoker     Packs/day: 0.50     Years: 10.00     Quit date: 10/8/2018   • Smokeless tobacco: Never Used      Comment: Stopped after starting Wellbutrin   • Alcohol use No       Social History     Social History Narrative   • No narrative on file       Family History   Problem Relation Age of Onset   • Hypertension Mother    • Alcohol/Drug Mother    • Obesity Mother    • Diabetes Father    • Obesity Father    • GI Sister         GERD   • Thyroid Maternal Aunt         hypo   • Diabetes Maternal Grandfather    • Obesity Maternal Grandfather    • Hypertension Maternal Grandfather    • No Known Problems Brother    • Hyperlipidemia Maternal Grandmother    • No Known Problems Sister    • No Known Problems Sister    • No Known Problems Sister    • Breast Cancer Other    • Stroke Neg Hx    • Heart Disease Neg Hx      ROS:   Constitutional:  Negative for fever, chills, unexpected weight change, night sweats, body aches, sleep issues,  and fatigue/generalized weakness.  "  HEENT: Positive for tension headaches.  Negative for vision changes, hearing changes, ear pain, tinnitus, ear discharge, rhinorrhea, sinus congestion, sneezing, sore throat, and neck pain.    Respiratory:  Negative for cough, shortness of breath, sputum production, hemoptysis, chest congestion, dyspnea, wheezing, and crackles.    Cardiovascular:  Negative for chest pain, palpitations, TREVIZO, paroxsymal nocturnal dyspnea, orthopnea, and bilateral lower extremity edema.   Musculoskeletal:  Negative for myalgias, back pain, and joint pain.   Neurological: Positive for tension headaches.  Negative for dizziness, tingling, tremors, focal sensory deficit, focal weakness.   Psychiatric/Behavioral: Positive for continued depression and anxiety, negative for suicidal/homicidal ideation and memory loss.        Exam: /72 (BP Location: Left arm, Patient Position: Sitting, BP Cuff Size: Adult)   Pulse (!) 110   Temp 36.6 °C (97.8 °F) (Temporal)   Ht 1.676 m (5' 6\")   Wt 81.6 kg (180 lb)   SpO2 95%  Body mass index is 29.05 kg/m².    General:  Normal appearing. No distress.  HEENT:  Normocephalic. Eyes conjunctiva clear lids without ptosis, pupils equal and reactive to light accommodation, ears normal shape and contour.   Neurologic:  Grossly nonfocal.  Skin:  Warm and dry.  No obvious lesions.  Musculoskeletal:  Normal gait. No extremity cyanosis, clubbing, or edema.  Psych:  Normal mood and affect. Alert and oriented x3. Judgment and insight is normal.    Assessment/Plan  1. Overweight (BMI 25.0-29.9)  HEMOGLOBIN A1C   2. Anxiety  VITAMIN D,25 HYDROXY    TSH WITH REFLEX TO FT4     Educated in proper administration of medication(s) ordered today including safety, possible SE, risks, benefits, rationale and alternatives to therapy.   Supportive care, differential diagnoses, and indications for immediate follow-up discussed with patient.    Pathogenesis of diagnosis discussed including typical length and natural " progression.    Instructed to return to clinic or nearest emergency department for any change in condition, further concerns, or worsening of symptoms.  Patient states understanding of the plan of care and discharge instructions.    Return in about 6 months (around 10/30/2019) for Established Patient - Short, Follow up Labs.    Please note that this dictation was created using voice recognition software. I have made every reasonable attempt to correct obvious errors, but I expect that there are errors of grammar and possibly content that I did not discover before finalizing the note.

## 2020-03-26 ENCOUNTER — OFFICE VISIT (OUTPATIENT)
Dept: URGENT CARE | Facility: CLINIC | Age: 30
End: 2020-03-26
Payer: COMMERCIAL

## 2020-03-26 VITALS
TEMPERATURE: 98 F | OXYGEN SATURATION: 97 % | WEIGHT: 180 LBS | SYSTOLIC BLOOD PRESSURE: 118 MMHG | BODY MASS INDEX: 28.25 KG/M2 | DIASTOLIC BLOOD PRESSURE: 78 MMHG | RESPIRATION RATE: 14 BRPM | HEART RATE: 104 BPM | HEIGHT: 67 IN

## 2020-03-26 DIAGNOSIS — R50.9 FEVER, UNSPECIFIED FEVER CAUSE: ICD-10-CM

## 2020-03-26 DIAGNOSIS — J02.9 PHARYNGITIS, UNSPECIFIED ETIOLOGY: ICD-10-CM

## 2020-03-26 DIAGNOSIS — M79.10 MYALGIA: ICD-10-CM

## 2020-03-26 LAB
FLUAV+FLUBV AG SPEC QL IA: NEGATIVE
INT CON NEG: NEGATIVE
INT CON NEG: NEGATIVE
INT CON POS: POSITIVE
INT CON POS: POSITIVE
S PYO AG THROAT QL: NEGATIVE

## 2020-03-26 PROCEDURE — 99214 OFFICE O/P EST MOD 30 MIN: CPT | Performed by: FAMILY MEDICINE

## 2020-03-26 PROCEDURE — 87880 STREP A ASSAY W/OPTIC: CPT | Performed by: FAMILY MEDICINE

## 2020-03-26 PROCEDURE — 87804 INFLUENZA ASSAY W/OPTIC: CPT | Performed by: FAMILY MEDICINE

## 2020-03-26 RX ORDER — NAPROXEN 500 MG/1
500 TABLET ORAL 2 TIMES DAILY WITH MEALS
Qty: 20 TAB | Refills: 0 | Status: SHIPPED | OUTPATIENT
Start: 2020-03-26 | End: 2020-04-05

## 2020-03-26 RX ORDER — LIDOCAINE HYDROCHLORIDE 20 MG/ML
15 SOLUTION OROPHARYNGEAL EVERY 4 HOURS PRN
Qty: 120 ML | Refills: 0 | Status: SHIPPED | OUTPATIENT
Start: 2020-03-26

## 2020-03-26 ASSESSMENT — ENCOUNTER SYMPTOMS
NAUSEA: 0
EYE DISCHARGE: 0
WEIGHT LOSS: 0
EYE REDNESS: 0
MYALGIAS: 1
VOMITING: 0

## 2020-03-26 NOTE — PROGRESS NOTES
"Subjective:      Amy Rey is a 29 y.o. female who presents with Fever (sore throat , headache , lost in taste)            Onset yesterday ST, fever tmax 100.4. Dull GUPTA, min relief with ibuprofen. Feels that smell and taste are decreased. Mild dry cough. No nasal congestion. No other aggravating or alleviating factors.        Review of Systems   Constitutional: Negative for malaise/fatigue and weight loss.   Eyes: Negative for discharge and redness.   Gastrointestinal: Negative for nausea and vomiting.   Musculoskeletal: Positive for myalgias. Negative for joint pain.   Skin: Negative for itching and rash.     .  Medications, Allergies, and current problem list reviewed today in Epic       Objective:     /78   Pulse (!) 104   Temp 36.7 °C (98 °F) (Temporal)   Resp 14   Ht 1.702 m (5' 7\")   Wt 81.6 kg (180 lb)   SpO2 97%   BMI 28.19 kg/m²      Physical Exam  Constitutional:       General: She is not in acute distress.     Appearance: She is well-developed.   HENT:      Head: Normocephalic and atraumatic.      Right Ear: Tympanic membrane normal.      Left Ear: Tympanic membrane normal.      Nose: No congestion or rhinorrhea.      Mouth/Throat:      Pharynx: Posterior oropharyngeal erythema present. No oropharyngeal exudate.   Eyes:      Conjunctiva/sclera: Conjunctivae normal.   Cardiovascular:      Rate and Rhythm: Normal rate and regular rhythm.      Heart sounds: Normal heart sounds. No murmur.   Pulmonary:      Effort: Pulmonary effort is normal.      Breath sounds: Normal breath sounds. No wheezing.   Skin:     General: Skin is warm and dry.      Findings: No rash.   Neurological:      Mental Status: She is alert and oriented to person, place, and time.                 Assessment/Plan:     Rapid strep negative  Rapid influenza negative    1. Pharyngitis, unspecified etiology  POCT Rapid Strep A    POCT Influenza A/B   2. Fever, unspecified fever cause  POCT Rapid Strep A    POCT Influenza A/B "     Differential diagnosis, natural history, supportive care, and indications for immediate follow-up discussed at length.     Self-isolation per COVID-19 protocol

## 2020-03-26 NOTE — LETTER
March 26, 2020         Patient: Amy Rey   YOB: 1990   Date of Visit: 3/26/2020           To Whom it May Concern:    Amy Rey was seen in my clinic on 3/26/2020.  Please excuse absences per self-isolation protocol.      Sincerely,           Stanley Zhang M.D.  Electronically Signed

## 2020-04-29 ENCOUNTER — NON-PROVIDER VISIT (OUTPATIENT)
Dept: URGENT CARE | Facility: PHYSICIAN GROUP | Age: 30
End: 2020-04-29

## 2020-04-29 DIAGNOSIS — Z02.1 PRE-EMPLOYMENT DRUG SCREENING: ICD-10-CM

## 2020-04-29 LAB
AMP AMPHETAMINE: NORMAL
COC COCAINE: NORMAL
INT CON NEG: NORMAL
INT CON POS: NORMAL
MET METHAMPHETAMINES: NORMAL
OPI OPIATES: NORMAL
PCP PHENCYCLIDINE: NORMAL
POC DRUG COMMENT 753798-OCCUPATIONAL HEALTH: NEGATIVE
THC: NORMAL

## 2020-04-29 PROCEDURE — 80305 DRUG TEST PRSMV DIR OPT OBS: CPT | Performed by: PHYSICIAN ASSISTANT

## 2020-06-24 ENCOUNTER — NON-PROVIDER VISIT (OUTPATIENT)
Dept: URGENT CARE | Facility: PHYSICIAN GROUP | Age: 30
End: 2020-06-24

## 2020-06-24 DIAGNOSIS — Z02.83 ENCOUNTER FOR DRUG SCREENING: ICD-10-CM

## 2020-06-24 PROCEDURE — 8907 PR URINE COLLECT ONLY: Performed by: FAMILY MEDICINE

## 2020-07-14 ENCOUNTER — HOSPITAL ENCOUNTER (EMERGENCY)
Dept: HOSPITAL 8 - ED | Age: 30
LOS: 1 days | Discharge: HOME | End: 2020-07-15
Payer: COMMERCIAL

## 2020-07-14 VITALS — HEIGHT: 65 IN | BODY MASS INDEX: 28.28 KG/M2 | WEIGHT: 169.76 LBS

## 2020-07-14 DIAGNOSIS — F32.9: Primary | ICD-10-CM

## 2020-07-14 LAB
ALBUMIN SERPL-MCNC: 3.9 G/DL (ref 3.4–5)
ALP SERPL-CCNC: 62 U/L (ref 45–117)
ALT SERPL-CCNC: 20 U/L (ref 12–78)
ANION GAP SERPL CALC-SCNC: 6 MMOL/L (ref 5–15)
BASOPHILS # BLD AUTO: 0.04 X10^3/UL (ref 0–0.1)
BASOPHILS NFR BLD AUTO: 1 % (ref 0–1)
BILIRUB SERPL-MCNC: 0.3 MG/DL (ref 0.2–1)
CALCIUM SERPL-MCNC: 8.9 MG/DL (ref 8.5–10.1)
CHLORIDE SERPL-SCNC: 106 MMOL/L (ref 98–107)
CREAT SERPL-MCNC: 0.84 MG/DL (ref 0.55–1.02)
EOSINOPHIL # BLD AUTO: 0.08 X10^3/UL (ref 0–0.4)
EOSINOPHIL NFR BLD AUTO: 1 % (ref 1–7)
ERYTHROCYTE [DISTWIDTH] IN BLOOD BY AUTOMATED COUNT: 15.9 % (ref 9.6–15.2)
LYMPHOCYTES # BLD AUTO: 2.81 X10^3/UL (ref 1–3.4)
LYMPHOCYTES NFR BLD AUTO: 36 % (ref 22–44)
MCH RBC QN AUTO: 24.3 PG (ref 27–34.8)
MCHC RBC AUTO-ENTMCNC: 32 G/DL (ref 32.4–35.8)
MCV RBC AUTO: 75.7 FL (ref 80–100)
MD: NO
MONOCYTES # BLD AUTO: 0.59 X10^3/UL (ref 0.2–0.8)
MONOCYTES NFR BLD AUTO: 8 % (ref 2–9)
NEUTROPHILS # BLD AUTO: 4.27 X10^3/UL (ref 1.8–6.8)
NEUTROPHILS NFR BLD AUTO: 55 % (ref 42–75)
PLATELET # BLD AUTO: 364 X10^3/UL (ref 130–400)
PMV BLD AUTO: 9.2 FL (ref 7.4–10.4)
PROT SERPL-MCNC: 7.9 G/DL (ref 6.4–8.2)
RBC # BLD AUTO: 5.1 X10^6/UL (ref 3.82–5.3)
VANCOMYCIN TROUGH SERPL-MCNC: 3.2 MG/DL (ref 2.8–20)

## 2020-07-14 PROCEDURE — 99284 EMERGENCY DEPT VISIT MOD MDM: CPT

## 2020-07-14 PROCEDURE — 85025 COMPLETE CBC W/AUTO DIFF WBC: CPT

## 2020-07-14 PROCEDURE — 84443 ASSAY THYROID STIM HORMONE: CPT

## 2020-07-14 PROCEDURE — 80053 COMPREHEN METABOLIC PANEL: CPT

## 2020-07-14 PROCEDURE — 81003 URINALYSIS AUTO W/O SCOPE: CPT

## 2020-07-14 PROCEDURE — 36415 COLL VENOUS BLD VENIPUNCTURE: CPT

## 2020-07-14 PROCEDURE — 81025 URINE PREGNANCY TEST: CPT

## 2020-07-14 PROCEDURE — 80307 DRUG TEST PRSMV CHEM ANLYZR: CPT

## 2020-07-14 NOTE — NUR
PT BIB [facility] on a legal 2000 for SI. pt states she has been feeling 
increasingly depressed over the past few weeks and now she is having thoughts 
about suicide. states she doesnt have a specific plan but is thinking about 
taking pills to OD or crashing her car "or something like that." no hx SA but 
she has been off of her meds for 1 year. was taking lexapro and another psych 
drug. pt admit to meth use. last use yesterday. pt tearful and anxious, but 
cooperative. All belongings in 2 bags, placed in locker. Sitter at hallways for 
frequesnt checks.

## 2020-07-15 VITALS — DIASTOLIC BLOOD PRESSURE: 79 MMHG | SYSTOLIC BLOOD PRESSURE: 119 MMHG

## 2020-07-15 LAB
HCG UR SG: 1.02 (ref 1–1.03)
MICROSCOPIC: (no result)

## 2020-07-15 NOTE — NUR
PT GIVEN AM MEAL TRAY.  PT STATED SHE FEELS INCREASE ANXIETY THIS AM.  WILL 
SPEAK WITH ERP ABOUT POSSIBLE MEDS.  PT CALM, RESTING IN BED.  SITTER AT DOOR 
FOR OBS.

## 2020-07-15 NOTE — NUR
pt resting in bed, with pt room si secure with sitter at pt door. pt has no 
needs at this time, writer will continue to monitor pt.

## 2020-07-15 NOTE — NUR
pt resting in bed with eyes closed.  attempted to give pt meds for HA and 
anxiety, pt resting calmly in bed with eyes closed, did not arouse to verbal 
name call, resp evena dn non labored  will continue to monitor. sitter at door.
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
